# Patient Record
Sex: FEMALE | Race: WHITE | NOT HISPANIC OR LATINO | Employment: OTHER | URBAN - METROPOLITAN AREA
[De-identification: names, ages, dates, MRNs, and addresses within clinical notes are randomized per-mention and may not be internally consistent; named-entity substitution may affect disease eponyms.]

---

## 2018-12-31 ENCOUNTER — APPOINTMENT (EMERGENCY)
Dept: CT IMAGING | Facility: HOSPITAL | Age: 38
End: 2018-12-31
Payer: COMMERCIAL

## 2018-12-31 ENCOUNTER — HOSPITAL ENCOUNTER (EMERGENCY)
Facility: HOSPITAL | Age: 38
Discharge: HOME | End: 2018-12-31
Attending: EMERGENCY MEDICINE | Admitting: EMERGENCY MEDICINE
Payer: COMMERCIAL

## 2018-12-31 VITALS
WEIGHT: 160 LBS | OXYGEN SATURATION: 100 % | BODY MASS INDEX: 25.71 KG/M2 | HEIGHT: 66 IN | SYSTOLIC BLOOD PRESSURE: 137 MMHG | RESPIRATION RATE: 18 BRPM | TEMPERATURE: 98.4 F | HEART RATE: 124 BPM | DIASTOLIC BLOOD PRESSURE: 73 MMHG

## 2018-12-31 DIAGNOSIS — R30.0 DYSURIA: Primary | ICD-10-CM

## 2018-12-31 LAB
ALBUMIN SERPL BCP-MCNC: 4.5 G/DL (ref 3.5–5.7)
ALP SERPL-CCNC: 53 U/L (ref 40–150)
ALT SERPL W P-5'-P-CCNC: 19 U/L (ref 7–52)
ANION GAP SERPL CALCULATED.3IONS-SCNC: 9 MMOL/L (ref 4–13)
AST SERPL W P-5'-P-CCNC: 17 U/L (ref 13–39)
BACTERIA UR QL AUTO: ABNORMAL /HPF
BASOPHILS # BLD AUTO: 0.1 THOUSANDS/ΜL (ref 0–0.1)
BASOPHILS NFR BLD AUTO: 1 % (ref 0–2)
BILIRUB SERPL-MCNC: 0.6 MG/DL (ref 0.2–1)
BILIRUB UR QL STRIP: NEGATIVE
BUN SERPL-MCNC: 8 MG/DL (ref 7–25)
CALCIUM SERPL-MCNC: 9.4 MG/DL (ref 8.6–10.5)
CHLORIDE SERPL-SCNC: 107 MMOL/L (ref 98–107)
CLARITY UR: CLEAR
CO2 SERPL-SCNC: 22 MMOL/L (ref 21–31)
COLOR UR: YELLOW
CREAT SERPL-MCNC: 0.76 MG/DL (ref 0.6–1.2)
EOSINOPHIL # BLD AUTO: 0.1 THOUSAND/ΜL (ref 0–0.61)
EOSINOPHIL NFR BLD AUTO: 1 % (ref 0–5)
ERYTHROCYTE [DISTWIDTH] IN BLOOD BY AUTOMATED COUNT: 13.3 % (ref 11.5–14.5)
EXT PREG TEST URINE: NEGATIVE
GFR SERPL CREATININE-BSD FRML MDRD: 98 ML/MIN/1.73SQ M
GLUCOSE SERPL-MCNC: 92 MG/DL (ref 65–99)
GLUCOSE UR STRIP-MCNC: NEGATIVE MG/DL
HCT VFR BLD AUTO: 42.7 % (ref 34.8–46.1)
HGB BLD-MCNC: 14.3 G/DL (ref 12–16)
HGB UR QL STRIP.AUTO: ABNORMAL
KETONES UR STRIP-MCNC: NEGATIVE MG/DL
LEUKOCYTE ESTERASE UR QL STRIP: NEGATIVE
LYMPHOCYTES # BLD AUTO: 2.1 THOUSANDS/ΜL (ref 0.6–4.47)
LYMPHOCYTES NFR BLD AUTO: 34 % (ref 21–51)
MCH RBC QN AUTO: 30.7 PG (ref 26–34)
MCHC RBC AUTO-ENTMCNC: 33.6 G/DL (ref 31–37)
MCV RBC AUTO: 91 FL (ref 81–99)
MONOCYTES # BLD AUTO: 0.3 THOUSAND/ΜL (ref 0.17–1.22)
MONOCYTES NFR BLD AUTO: 4 % (ref 2–12)
NEUTROPHILS # BLD AUTO: 3.8 THOUSANDS/ΜL (ref 1.4–6.5)
NEUTS SEG NFR BLD AUTO: 60 % (ref 42–75)
NITRITE UR QL STRIP: NEGATIVE
NON-SQ EPI CELLS URNS QL MICRO: ABNORMAL /HPF
NRBC BLD AUTO-RTO: 0 /100 WBCS
PH UR STRIP.AUTO: 6 [PH] (ref 5–8)
PLATELET # BLD AUTO: 332 THOUSANDS/UL (ref 149–390)
PMV BLD AUTO: 7.6 FL (ref 8.6–11.7)
POTASSIUM SERPL-SCNC: 4 MMOL/L (ref 3.5–5.5)
PROT SERPL-MCNC: 6.8 G/DL (ref 6.4–8.9)
PROT UR STRIP-MCNC: NEGATIVE MG/DL
RBC # BLD AUTO: 4.67 MILLION/UL (ref 3.9–5.2)
RBC #/AREA URNS AUTO: ABNORMAL /HPF
SODIUM SERPL-SCNC: 138 MMOL/L (ref 134–143)
SP GR UR STRIP.AUTO: <=1.005 (ref 1–1.03)
UROBILINOGEN UR QL STRIP.AUTO: 0.2 E.U./DL
WBC # BLD AUTO: 6.4 THOUSAND/UL (ref 4.8–10.8)
WBC #/AREA URNS AUTO: ABNORMAL /HPF

## 2018-12-31 PROCEDURE — 80053 COMPREHEN METABOLIC PANEL: CPT | Performed by: EMERGENCY MEDICINE

## 2018-12-31 PROCEDURE — 99284 EMERGENCY DEPT VISIT MOD MDM: CPT

## 2018-12-31 PROCEDURE — 74176 CT ABD & PELVIS W/O CONTRAST: CPT

## 2018-12-31 PROCEDURE — 96361 HYDRATE IV INFUSION ADD-ON: CPT

## 2018-12-31 PROCEDURE — 81001 URINALYSIS AUTO W/SCOPE: CPT | Performed by: EMERGENCY MEDICINE

## 2018-12-31 PROCEDURE — 36415 COLL VENOUS BLD VENIPUNCTURE: CPT | Performed by: EMERGENCY MEDICINE

## 2018-12-31 PROCEDURE — 96375 TX/PRO/DX INJ NEW DRUG ADDON: CPT

## 2018-12-31 PROCEDURE — 81025 URINE PREGNANCY TEST: CPT | Performed by: EMERGENCY MEDICINE

## 2018-12-31 PROCEDURE — 96374 THER/PROPH/DIAG INJ IV PUSH: CPT

## 2018-12-31 PROCEDURE — 85025 COMPLETE CBC W/AUTO DIFF WBC: CPT | Performed by: EMERGENCY MEDICINE

## 2018-12-31 RX ORDER — SULFAMETHOXAZOLE AND TRIMETHOPRIM 800; 160 MG/1; MG/1
1 TABLET ORAL EVERY 12 HOURS SCHEDULED
COMMUNITY
End: 2018-12-31

## 2018-12-31 RX ORDER — CEPHALEXIN 500 MG/1
500 CAPSULE ORAL EVERY 12 HOURS SCHEDULED
Qty: 14 CAPSULE | Refills: 0 | Status: SHIPPED | OUTPATIENT
Start: 2018-12-31 | End: 2019-01-07

## 2018-12-31 RX ORDER — ONDANSETRON 2 MG/ML
4 INJECTION INTRAMUSCULAR; INTRAVENOUS ONCE
Status: COMPLETED | OUTPATIENT
Start: 2018-12-31 | End: 2018-12-31

## 2018-12-31 RX ORDER — KETOROLAC TROMETHAMINE 30 MG/ML
15 INJECTION, SOLUTION INTRAMUSCULAR; INTRAVENOUS ONCE
Status: COMPLETED | OUTPATIENT
Start: 2018-12-31 | End: 2018-12-31

## 2018-12-31 RX ADMIN — KETOROLAC TROMETHAMINE 15 MG: 30 INJECTION, SOLUTION INTRAMUSCULAR; INTRAVENOUS at 14:33

## 2018-12-31 RX ADMIN — SODIUM CHLORIDE 1000 ML: 0.9 INJECTION, SOLUTION INTRAVENOUS at 14:33

## 2018-12-31 RX ADMIN — ONDANSETRON 4 MG: 2 INJECTION INTRAMUSCULAR; INTRAVENOUS at 14:33

## 2018-12-31 NOTE — DISCHARGE INSTRUCTIONS
Dysuria, Ambulatory Care   GENERAL INFORMATION:   Dysuria  is trouble urinating, or pain, burning, or discomfort when you urinate  Dysuria is usually a symptom of another problem, such as a blockage or urinary tract infection  Common symptoms include the following:   · Fever     · Cloudy, bad smelling urine     · Urge to urinate often but urinating little     · Back, side, or abdominal pain     · Blood in your urine     · Discharge that smells bad     · Itching  Seek immediate care for the following symptoms:   · Severe back, side, or abdominal pain    · A fever and shaking chills    · Vomiting several times in a row  Treatment for dysuria  may include medicines to treat a bacterial infection or help decrease bladder spasms  Manage your dysuria:   · Drink liquids as directed  Ask how much liquid to drink each day and which liquids are best for you  You may need to drink more liquid than usual to flush bacteria out of your body  · A sitz bath  may help decrease your pain  Healthcare providers may give you a portable sitz bath  This is a small tub that fits in the toilet  Fill the sitz bath or a bathtub with 4 to 6 inches of warm water  Sit in the warm water for 20 minutes 2 to 3 times a day  Follow up with your healthcare provider as directed:  Write down your questions so you remember to ask them during your visits  CARE AGREEMENT:   You have the right to help plan your care  Learn about your health condition and how it may be treated  Discuss treatment options with your caregivers to decide what care you want to receive  You always have the right to refuse treatment  The above information is an  only  It is not intended as medical advice for individual conditions or treatments  Talk to your doctor, nurse or pharmacist before following any medical regimen to see if it is safe and effective for you    © 2014 [employer] is for End User's use only and may not be sold, redistributed or otherwise used for commercial purposes  All illustrations and images included in CareNotes® are the copyrighted property of A D A M [employer]  or Gat[employer] Clifford

## 2018-12-31 NOTE — ED PROVIDER NOTES
History  Chief Complaint   Patient presents with    Fatigue     patient tested positive for UTI at urgent care and put on cipro on the 15th  tested again on friday and placed on bactrim   Flank Pain     49-year-old female with history of multiple UTIs presents for evaluation of dysuria and flank pain x1 week  Patient reports that she has additionally treated for UTI on 12/15/2018 with ciprofloxacin and for influenza at that time  Patient has continued to have dysuria and flank pain since that time and was seen 2 days prior at urgent care where she was prescribed Bactrim  Patient today with chills and flank pain which is currently left-sided, 5/10, nonradiating and associated with hematuria  Patient reports no aggravating or alleviating factors        History provided by:  Patient   used: No        Prior to Admission Medications   Prescriptions Last Dose Informant Patient Reported? Taking?   sulfamethoxazole-trimethoprim (BACTRIM DS) 800-160 mg per tablet   Yes Yes   Sig: Take 1 tablet by mouth every 12 (twelve) hours      Facility-Administered Medications: None       History reviewed  No pertinent past medical history  Past Surgical History:   Procedure Laterality Date    APPENDECTOMY      HYSTERECTOMY         History reviewed  No pertinent family history  I have reviewed and agree with the history as documented  Social History   Substance Use Topics    Smoking status: Heavy Tobacco Smoker     Packs/day: 1 00    Smokeless tobacco: Never Used    Alcohol use No        Review of Systems   Constitutional: Positive for chills and fatigue  Negative for fever  HENT: Negative for rhinorrhea and sore throat  Eyes: Negative for visual disturbance  Respiratory: Negative for cough and shortness of breath  Cardiovascular: Negative for chest pain and leg swelling  Gastrointestinal: Negative for abdominal pain, diarrhea, nausea and vomiting     Genitourinary: Positive for dysuria and flank pain  Musculoskeletal: Negative for back pain and myalgias  Skin: Negative for rash  Neurological: Negative for dizziness and headaches  Psychiatric/Behavioral: Negative for confusion  All other systems reviewed and are negative  Physical Exam  Physical Exam   Constitutional: She is oriented to person, place, and time  She appears well-developed and well-nourished  HENT:   Nose: Nose normal    Mouth/Throat: Oropharynx is clear and moist  No oropharyngeal exudate  Eyes: Pupils are equal, round, and reactive to light  Conjunctivae and EOM are normal  No scleral icterus  Neck: Normal range of motion  Neck supple  No JVD present  No tracheal deviation present  Cardiovascular: Normal rate, regular rhythm and normal heart sounds  No murmur heard  Pulmonary/Chest: Effort normal and breath sounds normal  No respiratory distress  She has no wheezes  She has no rales  Abdominal: Soft  Bowel sounds are normal  There is no tenderness  There is no guarding  Musculoskeletal: Normal range of motion  She exhibits no edema or tenderness  Neurological: She is alert and oriented to person, place, and time  No cranial nerve deficit or sensory deficit  She exhibits normal muscle tone  5/5 motor, nl sens   Skin: Skin is warm and dry  Psychiatric: She has a normal mood and affect  Her behavior is normal    Nursing note and vitals reviewed        Vital Signs  ED Triage Vitals [12/31/18 1338]   Temperature Pulse Respirations Blood Pressure SpO2   98 4 °F (36 9 °C) (!) 124 18 137/73 100 %      Temp Source Heart Rate Source Patient Position - Orthostatic VS BP Location FiO2 (%)   Temporal Monitor Sitting Left arm --      Pain Score       5           Vitals:    12/31/18 1338   BP: 137/73   Pulse: (!) 124   Patient Position - Orthostatic VS: Sitting       Visual Acuity      ED Medications  Medications   sodium chloride 0 9 % bolus 1,000 mL (0 mL Intravenous Stopped 12/31/18 1624)   [facility]) injection 4 mg (4 mg Intravenous Given 12/31/18 1433)   ketorolac (TORADOL) injection 15 mg (15 mg Intravenous Given 12/31/18 1433)       Diagnostic Studies  Results Reviewed     Procedure Component Value Units Date/Time    Comprehensive metabolic panel [[id number]] Collected:  12/31/18 1411    Lab Status:  Final result Specimen:  Blood from Arm, Right Updated:  12/31/18 1454     Sodium 138 mmol/L      Potassium 4 0 mmol/L      Chloride 107 mmol/L      CO2 22 mmol/L      ANION GAP 9 mmol/L      BUN 8 mg/dL      Creatinine 0 76 mg/dL      Glucose 92 mg/dL      Calcium 9 4 mg/dL      AST 17 U/L      ALT 19 U/L      Alkaline Phosphatase 53 U/L      Total Protein 6 8 g/dL      Albumin 4 5 g/dL      Total Bilirubin 0 60 mg/dL      eGFR 98 ml/min/1 73sq m     Narrative:         National Kidney Disease Education Program recommendations are as follows:  GFR calculation is accurate only with a steady state creatinine  Chronic Kidney disease less than 60 ml/min/1 73 sq  meters  Kidney failure less than 15 ml/min/1 73 sq  meters      Urine Microscopic [[id number]]  (Abnormal) Collected:  12/31/18 1403    Lab Status:  Final result Specimen:  Urine from Urine, Clean Catch Updated:  12/31/18 1440     RBC, UA 2-4 (A) /hpf      WBC, UA None Seen /hpf      Epithelial Cells Occasional /hpf      Bacteria, UA Occasional /hpf     CBC and differential [[id number]]  (Abnormal) Collected:  12/31/18 1411    Lab Status:  Final result Specimen:  Blood from Arm, Right Updated:  12/31/18 1438     WBC 6 40 Thousand/uL      RBC 4 67 Million/uL      Hemoglobin 14 3 g/dL      Hematocrit 42 7 %      MCV 91 fL      MCH 30 7 pg      MCHC 33 6 g/dL      RDW 13 3 %      MPV 7 6 (L) fL      Platelets 773 Thousands/uL      nRBC 0 /100 WBCs      Neutrophils Relative 60 %      Lymphocytes Relative 34 %      Monocytes Relative 4 %      Eosinophils Relative 1 %      Basophils Relative 1 %      Neutrophils Absolute 3 80 Thousands/µL      Lymphocytes Absolute 2 10 Thousands/µL      Monocytes Absolute 0 30 Thousand/µL      Eosinophils Absolute 0 10 Thousand/µL      Basophils Absolute 0 10 Thousands/µL     UA w Reflex to Microscopic w Reflex to Culture [[id number]]  (Abnormal) Collected:  12/31/18 1403    Lab Status:  Final result Specimen:  Urine from Urine, Clean Catch Updated:  12/31/18 1434     Color, UA Yellow     Clarity, UA Clear     Specific Gravity, UA <=1 005 (L)     pH, UA 6 0     Leukocytes, UA Negative     Nitrite, UA Negative     Protein, UA Negative mg/dl      Glucose, UA Negative mg/dl      Ketones, UA Negative mg/dl      Urobilinogen, UA 0 2 E U /dl      Bilirubin, UA Negative     Blood, UA Trace-lysed (A)    POCT pregnancy, urine [[id number]]  (Normal) Resulted:  12/31/18 1433    Lab Status:  Final result Updated:  12/31/18 1434     EXT PREG TEST UR (Ref: Negative) negative                 CT renal stone study abdomen pelvis without contrast   Final Result by Prachi Blakely (12/31 4344)   Punctate nonobstructive left renal calculus  No obstructive calculi  No   hydronephrosis  Multiple hepatic hypodensities are seen which cannot be further assessed   on this unenhanced exam but may reflect multiple hepatic cysts  Signed by Prachi Blakely MD                 Procedures  Procedures       Phone Contacts  ED Phone Contact    ED Course  ED Course as of Dec 31 1654   Mon Dec 31, 2018   1351 Patient seen examined at bedside  Labs and imaging ordered  1550 Results of labs imaging reviewed  Urinalysis is negative for acute UTI  CT abdomen pelvis with no evidence of obstruction or pyelonephritis  2406 Discussed with patient results of labs and imaging and plan for discharge home  Patient reports that she does not feel well with Bactrim plan to give dose of Keflex in ED and prescription for Keflex outpatient  Patient follow up with Urology as an outpatient  Patient to return to ED if any change or worsening condition  MDM  CritCare Time    Disposition  Final diagnoses:   Dysuria     Time reflects when diagnosis was documented in both MDM as applicable and the Disposition within this note     Time User Action Codes Description Comment    12/31/2018  4:14 PM Rakan Blocker Add [R30 0] Dysuria       ED Disposition     ED Disposition Condition Comment    Discharge  Elder Post discharge to home/self care  Condition at discharge: Stable        Follow-up Information     Follow up With Specialties Details Why Hola Jackson MD Urology  Please call for appointment [address] [place] [address]  Emergency Department Emergency Medicine  As needed, If symptoms worsen [address] [place] [id number] [phone]          Discharge Medication List as of 12/31/2018  4:15 PM      START taking these medications    Details   cephalexin (KEFLEX) 500 mg capsule Take 1 capsule (500 mg total) by mouth every 12 (twelve) hours for 7 days, Starting Mon 12/31/2018, Until Mon 1/7/2019, Print         STOP taking these medications       sulfamethoxazole-trimethoprim (BACTRIM DS) 800-160 mg per tablet Comments:   Reason for Stopping:             No discharge procedures on file      ED Provider  Electronically Signed by           Marylu Stearns DO  12/31/18 6231

## 2020-07-23 ENCOUNTER — OFFICE VISIT (OUTPATIENT)
Dept: INTERNAL MEDICINE CLINIC | Facility: CLINIC | Age: 40
End: 2020-07-23
Payer: COMMERCIAL

## 2020-07-23 VITALS
HEIGHT: 67 IN | SYSTOLIC BLOOD PRESSURE: 116 MMHG | BODY MASS INDEX: 24.96 KG/M2 | OXYGEN SATURATION: 98 % | HEART RATE: 99 BPM | WEIGHT: 159 LBS | TEMPERATURE: 98.1 F | DIASTOLIC BLOOD PRESSURE: 76 MMHG

## 2020-07-23 DIAGNOSIS — Z87.440 HISTORY OF RECURRENT UTIS: ICD-10-CM

## 2020-07-23 DIAGNOSIS — Z87.19 HISTORY OF BARRETT'S ESOPHAGUS: ICD-10-CM

## 2020-07-23 DIAGNOSIS — M54.50 ACUTE BILATERAL LOW BACK PAIN WITHOUT SCIATICA: ICD-10-CM

## 2020-07-23 DIAGNOSIS — Z13.21 ENCOUNTER FOR VITAMIN DEFICIENCY SCREENING: ICD-10-CM

## 2020-07-23 DIAGNOSIS — Z13.6 SCREENING FOR CARDIOVASCULAR CONDITION: ICD-10-CM
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
## 2020-07-23 DIAGNOSIS — Z13.29 SCREENING FOR THYROID DISORDER: ICD-10-CM

## 2020-07-23 DIAGNOSIS — N20.0 KIDNEY STONES: ICD-10-CM

## 2020-07-23 DIAGNOSIS — D22.9 CHANGE IN MOLE: ICD-10-CM

## 2020-07-23 DIAGNOSIS — F41.9 ANXIETY: Primary | ICD-10-CM

## 2020-07-23 PROCEDURE — 99204 OFFICE O/P NEW MOD 45 MIN: CPT | Performed by: NURSE PRACTITIONER

## 2020-07-23 PROCEDURE — 3008F BODY MASS INDEX DOCD: CPT | Performed by: NURSE PRACTITIONER

## 2020-07-23 RX ORDER — PREDNISONE 10 MG/1
TABLET ORAL
Qty: 45 TABLET | Refills: 0 | Status: SHIPPED | OUTPATIENT
Start: 2020-07-23 | End: 2020-08-07 | Stop reason: ALTCHOICE

## 2020-07-23 RX ORDER — CYCLOBENZAPRINE HCL 5 MG
5 TABLET ORAL
Qty: 30 TABLET | Refills: 0 | Status: SHIPPED | OUTPATIENT
Start: 2020-07-23 | End: 2020-08-07 | Stop reason: ALTCHOICE

## 2020-07-23 NOTE — PROGRESS NOTES
Assessment/Plan: UA in office was negative but will send urine out for culture  Will obtain fasting labs  Will order imaging of her sacrum and coccyx and lumbar spine  Will start on Flexeril 5 mg at bedtime and prednisone tapered dose  Will refer to Derm to assess change in mole  Will refer to GI for repeat scope  She is deferring a mammogram and GYN exam  Will notify her once urine and labs are back  Will follow up as needed  No problem-specific Assessment & Plan notes found for this encounter           Problem List Items Addressed This Visit        Musculoskeletal and Integument    Change in mole    Relevant Orders    Ambulatory referral to Dermatology    Comprehensive metabolic panel       Genitourinary    Kidney stones    Relevant Orders    CBC and differential    XR sacrum and coccyx    Comprehensive metabolic panel       Other    History of recurrent UTIs    Relevant Orders    CBC and differential    Comprehensive metabolic panel    UA w Reflex to Microscopic w Reflex to Culture -Lab Collect    Urine culture    Anxiety - Primary    Relevant Orders    CBC and differential    Comprehensive metabolic panel    Screening for cardiovascular condition    Relevant Orders    Lipid panel    Screening for thyroid disorder    Relevant Orders    TSH, 3rd generation with Free T4 reflex    Acute bilateral low back pain without sciatica    Relevant Medications    predniSONE 10 mg tablet    cyclobenzaprine (FLEXERIL) 5 mg tablet    Other Relevant Orders    XR sacrum and coccyx    XR spine lumbar minimum 4 views non injury    Comprehensive metabolic panel    Fall    Relevant Medications    cyclobenzaprine (FLEXERIL) 5 mg tablet    Other Relevant Orders    XR spine lumbar minimum 4 views non injury    Comprehensive metabolic panel    History of Leo's esophagus    Relevant Orders    Ambulatory referral to Gastroenterology    Comprehensive metabolic panel    Encounter for vitamin deficiency screening    Relevant Orders Vitamin D 25 hydroxy      Other Visit Diagnoses     BMI 25 0-25 9,adult                Subjective:      Patient ID: Kat Marlow is a 41 y o  female  Rosario Gray is to establish care as a new patient  She has a past medical history of kidney stones, frequent UTIs, back injury, and anxiety  She states around one month ago she did fall and injury her lower back  She is still having pain and pressure and did have a back injury when she was in her teens  She states she is applying heat but nothing seems to help  She has seen Rheumatology in the past and was diagnosed with fibro but does not believe in that diagnosis  She states she does deal with her pain on her own  She does take Prilosec OTC and states she does have a history of Barretts  She has had scopes done in the past and will be due again  She is having some UTI symptoms today of feelings of warm urine and frequency  She states she is not having any burning or pain  She states she is allergy to levaquin, Bactrim, and Macrobid and usually only takes Cipro  She denies any chest pain, SOB, or palpitation  She does have some anxiety but does not want any medications  She does smoke around 1 PPD  She does drink socially and denies any drug use  She is deferring a mammogram and did have a partial hyster  She does have a cyst above her right eye she would like removed and a change in mole to her right leg  She does want to see Derm in [place]  She offers no other issues  The following portions of the patient's history were reviewed and updated as appropriate:   She  has no past medical history on file    She   Patient Active Problem List    Diagnosis Date Noted    Kidney stones 07/23/2020    History of recurrent UTIs 07/23/2020    Anxiety 07/23/2020    Screening for cardiovascular condition 07/23/2020    Screening for thyroid disorder 07/23/2020    Acute bilateral low back pain without sciatica 07/23/2020    Fall 07/23/2020    History of Leo's esophagus 07/23/2020    Change in mole 07/23/2020    Encounter for vitamin deficiency screening 07/23/2020     She  has a past surgical history that includes Appendectomy and Hysterectomy  Her family history is not on file  She  reports that she has been smoking  She has been smoking about 1 00 pack per day  She has never used smokeless tobacco  She reports that she does not drink alcohol or use drugs  Current Outpatient Medications   Medication Sig Dispense Refill    cyclobenzaprine (FLEXERIL) 5 mg tablet Take 1 tablet (5 mg total) by mouth daily at bedtime 30 tablet 0    predniSONE 10 mg tablet 50 mg by mouth daily for 3 days, then 40 mg by mouth daily for 3 days, then 30 mg by mouth daily for 3 days, then take 20 mg by mouth daily for 3 days, then 10 mg by mouth daily for 3 days 45 tablet 0     No current facility-administered medications for this visit  No current outpatient medications on file prior to visit  No current facility-administered medications on file prior to visit  She is allergic to levofloxacin and nitrofurantoin       Review of Systems   Constitutional: Negative  HENT: Negative  Eyes: Negative  Respiratory: Negative  Cardiovascular: Negative  Gastrointestinal: Negative  Endocrine: Negative  Genitourinary: Positive for frequency  Musculoskeletal: Positive for back pain  Skin: Negative  Allergic/Immunologic: Negative  Neurological: Negative  Hematological: Negative  Psychiatric/Behavioral: Negative  Objective:      /76   Pulse 99   Temp 98 1 °F (36 7 °C)   Ht 5' 6 5" (1 689 m)   Wt 72 1 kg (159 lb)   SpO2 98%   BMI 25 28 kg/m²          Physical Exam   Constitutional: She is oriented to person, place, and time  She appears well-developed and well-nourished  HENT:   Head: Normocephalic and atraumatic     Right Ear: External ear normal    Left Ear: External ear normal    Nose: Nose normal    Mouth/Throat: Oropharynx is clear and moist    Eyes: Pupils are equal, round, and reactive to light  Conjunctivae and EOM are normal    Neck: Normal range of motion  Neck supple  Cardiovascular: Normal rate, regular rhythm, normal heart sounds and intact distal pulses  Pulmonary/Chest: Effort normal and breath sounds normal    Abdominal: Soft  Bowel sounds are normal    Musculoskeletal: Normal range of motion  She exhibits tenderness  To lower back    Neurological: She is alert and oriented to person, place, and time  Skin: Skin is warm and dry  Capillary refill takes less than 2 seconds  Psychiatric: She has a normal mood and affect  Her behavior is normal  Judgment and thought content normal    Vitals reviewed  BMI Counseling: Body mass index is 25 28 kg/m²  The BMI is above normal  Nutrition recommendations include reducing portion sizes, decreasing overall calorie intake, 3-5 servings of fruits/vegetables daily, reducing fast food intake, consuming healthier snacks, decreasing soda and/or juice intake, moderation in carbohydrate intake, increasing intake of lean protein, reducing intake of saturated fat and trans fat and reducing intake of cholesterol

## 2020-07-24 PROCEDURE — 87086 URINE CULTURE/COLONY COUNT: CPT | Performed by: NURSE PRACTITIONER

## 2020-07-25 LAB — BACTERIA UR CULT: NORMAL

## 2020-07-28 ENCOUNTER — APPOINTMENT (OUTPATIENT)
Dept: LAB | Facility: CLINIC | Age: 40
End: 2020-07-28
Payer: COMMERCIAL

## 2020-07-28 DIAGNOSIS — M54.50 ACUTE BILATERAL LOW BACK PAIN WITHOUT SCIATICA: ICD-10-CM

## 2020-07-28 DIAGNOSIS — Z87.440 HISTORY OF RECURRENT UTIS: ICD-10-CM

## 2020-07-28 DIAGNOSIS — M79.7 FIBROMYALGIA: ICD-10-CM

## 2020-07-28 DIAGNOSIS — Z87.19 HISTORY OF BARRETT'S ESOPHAGUS: ICD-10-CM

## 2020-07-28 DIAGNOSIS — Z13.29 SCREENING FOR THYROID DISORDER: ICD-10-CM

## 2020-07-28 DIAGNOSIS — M54.50 ACUTE BILATERAL LOW BACK PAIN WITHOUT SCIATICA: Primary | ICD-10-CM

## 2020-07-28 DIAGNOSIS — F41.9 ANXIETY: ICD-10-CM

## 2020-07-28 DIAGNOSIS — M79.7 FIBROMYALGIA: Primary | ICD-10-CM
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
## 2020-07-28 DIAGNOSIS — D22.9 CHANGE IN MOLE: ICD-10-CM

## 2020-07-28 DIAGNOSIS — Z13.21 ENCOUNTER FOR VITAMIN DEFICIENCY SCREENING: ICD-10-CM

## 2020-07-28 DIAGNOSIS — M43.16 SPONDYLOLISTHESIS OF LUMBAR REGION: Primary | ICD-10-CM

## 2020-07-28 DIAGNOSIS — N20.0 KIDNEY STONES: ICD-10-CM

## 2020-07-28 DIAGNOSIS — R10.2 PELVIC PAIN: Primary | ICD-10-CM

## 2020-07-28 DIAGNOSIS — Z13.6 SCREENING FOR CARDIOVASCULAR CONDITION: ICD-10-CM

## 2020-07-28 LAB
25(OH)D3 SERPL-MCNC: 31.4 NG/ML (ref 30–100)
ALBUMIN SERPL BCP-MCNC: 3.6 G/DL (ref 3.5–5)
ALP SERPL-CCNC: 63 U/L (ref 46–116)
ALT SERPL W P-5'-P-CCNC: 20 U/L (ref 12–78)
ANION GAP SERPL CALCULATED.3IONS-SCNC: 6 MMOL/L (ref 4–13)
AST SERPL W P-5'-P-CCNC: 7 U/L (ref 5–45)
BASOPHILS # BLD AUTO: 0.03 THOUSANDS/ΜL (ref 0–0.1)
BASOPHILS NFR BLD AUTO: 0 % (ref 0–1)
BILIRUB SERPL-MCNC: 0.39 MG/DL (ref 0.2–1)
BUN SERPL-MCNC: 23 MG/DL (ref 5–25)
CALCIUM SERPL-MCNC: 8.8 MG/DL (ref 8.3–10.1)
CHLORIDE SERPL-SCNC: 107 MMOL/L (ref 100–108)
CHOLEST SERPL-MCNC: 247 MG/DL (ref 50–200)
CO2 SERPL-SCNC: 26 MMOL/L (ref 21–32)
CREAT SERPL-MCNC: 0.68 MG/DL (ref 0.6–1.3)
CRP SERPL QL: <3 MG/L
EOSINOPHIL # BLD AUTO: 0.07 THOUSAND/ΜL (ref 0–0.61)
EOSINOPHIL NFR BLD AUTO: 1 % (ref 0–6)
ERYTHROCYTE [DISTWIDTH] IN BLOOD BY AUTOMATED COUNT: 14.3 % (ref 11.6–15.1)
ERYTHROCYTE [SEDIMENTATION RATE] IN BLOOD: 3 MM/HOUR (ref 0–19)
GFR SERPL CREATININE-BSD FRML MDRD: 108 ML/MIN/1.73SQ M
GLUCOSE P FAST SERPL-MCNC: 94 MG/DL (ref 65–99)
HCT VFR BLD AUTO: 41.2 % (ref 34.8–46.1)
HDLC SERPL-MCNC: 71 MG/DL
HGB BLD-MCNC: 13.6 G/DL (ref 11.5–15.4)
IMM GRANULOCYTES # BLD AUTO: 0.05 THOUSAND/UL (ref 0–0.2)
IMM GRANULOCYTES NFR BLD AUTO: 0 % (ref 0–2)
LDLC SERPL CALC-MCNC: 163 MG/DL (ref 0–100)
LYMPHOCYTES # BLD AUTO: 4.01 THOUSANDS/ΜL (ref 0.6–4.47)
LYMPHOCYTES NFR BLD AUTO: 34 % (ref 14–44)
MCH RBC QN AUTO: 30.7 PG (ref 26.8–34.3)
MCHC RBC AUTO-ENTMCNC: 33 G/DL (ref 31.4–37.4)
MCV RBC AUTO: 93 FL (ref 82–98)
MONOCYTES # BLD AUTO: 0.56 THOUSAND/ΜL (ref 0.17–1.22)
MONOCYTES NFR BLD AUTO: 5 % (ref 4–12)
NEUTROPHILS # BLD AUTO: 7.25 THOUSANDS/ΜL (ref 1.85–7.62)
NEUTS SEG NFR BLD AUTO: 60 % (ref 43–75)
NONHDLC SERPL-MCNC: 176 MG/DL
NRBC BLD AUTO-RTO: 0 /100 WBCS
PLATELET # BLD AUTO: 335 THOUSANDS/UL (ref 149–390)
PMV BLD AUTO: 9.5 FL (ref 8.9–12.7)
POTASSIUM SERPL-SCNC: 3.6 MMOL/L (ref 3.5–5.3)
PROT SERPL-MCNC: 6.7 G/DL (ref 6.4–8.2)
RBC # BLD AUTO: 4.43 MILLION/UL (ref 3.81–5.12)
SODIUM SERPL-SCNC: 139 MMOL/L (ref 136–145)
TRIGL SERPL-MCNC: 64 MG/DL
TSH SERPL DL<=0.05 MIU/L-ACNC: 1.4 UIU/ML (ref 0.36–3.74)
WBC # BLD AUTO: 11.97 THOUSAND/UL (ref 4.31–10.16)

## 2020-07-28 PROCEDURE — 85652 RBC SED RATE AUTOMATED: CPT

## 2020-07-28 PROCEDURE — 85025 COMPLETE CBC W/AUTO DIFF WBC: CPT

## 2020-07-28 PROCEDURE — 84443 ASSAY THYROID STIM HORMONE: CPT

## 2020-07-28 PROCEDURE — 82306 VITAMIN D 25 HYDROXY: CPT

## 2020-07-28 PROCEDURE — 80053 COMPREHEN METABOLIC PANEL: CPT

## 2020-07-28 PROCEDURE — 36415 COLL VENOUS BLD VENIPUNCTURE: CPT

## 2020-07-28 PROCEDURE — 86140 C-REACTIVE PROTEIN: CPT

## 2020-07-28 PROCEDURE — 80061 LIPID PANEL: CPT

## 2020-08-03 DIAGNOSIS — R10.9 ABDOMINAL PAIN, UNSPECIFIED ABDOMINAL LOCATION: Primary | ICD-10-CM

## 2020-08-06 ENCOUNTER — HOSPITAL ENCOUNTER (OUTPATIENT)
Dept: ULTRASOUND IMAGING | Facility: HOSPITAL | Age: 40
Discharge: HOME | End: 2020-08-06
Payer: COMMERCIAL

## 2020-08-06 DIAGNOSIS — R10.2 PELVIC PAIN: ICD-10-CM

## 2020-08-06 DIAGNOSIS — R10.9 ABDOMINAL PAIN, UNSPECIFIED ABDOMINAL LOCATION: ICD-10-CM

## 2020-08-06 PROCEDURE — 76856 US EXAM PELVIC COMPLETE: CPT

## 2020-08-06 PROCEDURE — 76700 US EXAM ABDOM COMPLETE: CPT

## 2020-08-06 PROCEDURE — 76830 TRANSVAGINAL US NON-OB: CPT

## 2020-08-07 ENCOUNTER — HOSPITAL ENCOUNTER (OUTPATIENT)
Dept: CT IMAGING | Facility: HOSPITAL | Age: 40
Discharge: HOME | End: 2020-08-07
Payer: COMMERCIAL

## 2020-08-07 ENCOUNTER — OFFICE VISIT (OUTPATIENT)
Dept: INTERNAL MEDICINE CLINIC | Facility: CLINIC | Age: 40
End: 2020-08-07
Payer: COMMERCIAL

## 2020-08-07 VITALS
HEART RATE: 99 BPM | SYSTOLIC BLOOD PRESSURE: 118 MMHG | DIASTOLIC BLOOD PRESSURE: 86 MMHG | OXYGEN SATURATION: 99 % | TEMPERATURE: 97.5 F

## 2020-08-07 DIAGNOSIS — R10.31 RIGHT LOWER QUADRANT ABDOMINAL PAIN: ICD-10-CM

## 2020-08-07 DIAGNOSIS — R10.33 PERIUMBILICAL ABDOMINAL PAIN: ICD-10-CM

## 2020-08-07 DIAGNOSIS — R10.33 PERIUMBILICAL ABDOMINAL PAIN: Primary | ICD-10-CM

## 2020-08-07 DIAGNOSIS — R11.0 NAUSEA: ICD-10-CM

## 2020-08-07 PROCEDURE — G1004 CDSM NDSC: HCPCS

## 2020-08-07 PROCEDURE — 99213 OFFICE O/P EST LOW 20 MIN: CPT | Performed by: NURSE PRACTITIONER

## 2020-08-07 PROCEDURE — 74176 CT ABD & PELVIS W/O CONTRAST: CPT

## 2020-08-07 RX ORDER — NAPROXEN 500 MG/1
500 TABLET ORAL 2 TIMES DAILY WITH MEALS
Qty: 30 TABLET | Refills: 0 | Status: ON HOLD | OUTPATIENT
Start: 2020-08-07 | End: 2020-10-13

## 2020-08-07 NOTE — PATIENT INSTRUCTIONS
Acute Abdominal Pain   WHAT YOU NEED TO KNOW:   What do I need to know about acute abdominal pain? Acute abdominal pain usually starts suddenly and gets worse quickly  What are minor causes of acute abdominal pain? · An allergic reaction to food, or food poisoning    · Stress    · Acid reflux    · Constipation    · Monthly period pain in females  What are serious causes of acute abdominal pain? · Inflammation or rupture of your appendix    · Swelling or an infection in your abdomen or organ    · A blockage in your bowels    · An ulcer or a tear in your esophagus, stomach, or intestines    · Bleeding in your abdomen or an organ    · Stones in your kidney or gallbladder    · Diseases of the fallopian tubes or ovaries    · An ectopic pregnancy  How is the cause of acute abdominal pain diagnosed? Your healthcare provider will ask about your signs and symptoms  Tell the provider when your symptoms started and about any recent travel or surgery  Also tell him what makes the pain better or worse, and what treatments you have tried  The provider will examine you  Based on what your provider finds from the exam, and your symptoms, you may need other tests  Examples include blood or urine tests, an ultrasound, a CT scan, or an endoscopy  How is acute abdominal pain treated? Treatment may depend on the cause of your abdominal pain  You may need any of the following:  · Medicines  may be given to decrease pain, treat an infection, and manage your symptoms, such as constipation  · Surgery  may be needed to treat a serious cause of abdominal pain  Examples include surgery to treat appendicitis or a blockage in your bowels  How can I manage my symptoms? · Apply heat  on your abdomen for 20 to 30 minutes every 2 hours for as many days as directed  Heat helps decrease pain and muscle spasms  · Make changes to the food you eat as directed  Do not eat foods that cause abdominal pain or other symptoms   Eat small meals more often  ¨ Eat more high-fiber foods if you are constipated  High-fiber foods include fruits, vegetables, whole-grain foods, and legumes  ¨ Do not eat foods that cause gas if you have bloating  Examples include broccoli, cabbage, and cauliflower  Do not drink soda or carbonated drinks, because these may also cause gas  ¨ Do not eat foods or drinks that contain sorbitol or fructose if you have diarrhea and bloating  Some examples are fruit juices, candy, jelly, and sugar-free gum  ¨ Do not eat high-fat foods, such as fried foods, cheeseburgers, hot dogs, and desserts  ¨ Limit or do not drink caffeine  Caffeine may make symptoms, such as heart burn or nausea, worse  ¨ Drink plenty of liquids to prevent dehydration from diarrhea or vomiting  Ask your healthcare provider how much liquid to drink each day and which liquids are best for you  · Manage your stress  Stress may cause abdominal pain  Your healthcare provider may recommend relaxation techniques and deep breathing exercises to help decrease your stress  Your healthcare provider may recommend you talk to someone about your stress or anxiety, such as a counselor or a trusted friend  Get plenty of sleep and exercise regularly  · Limit or do not drink alcohol  Alcohol can make your abdominal pain worse  Ask your healthcare provider if it is safe for you to drink alcohol  Also ask how much is safe for you to drink  · Do not smoke  Nicotine and other chemicals in cigarettes can damage your esophagus and stomach  Ask your healthcare provider for information if you currently smoke and need help to quit  E-cigarettes or smokeless tobacco still contain nicotine  Talk to your healthcare provider before you use these products  When should I seek immediate care? · You vomit blood or cannot stop vomiting  · You have blood in your bowel movement or it looks like tar  · You have bleeding from your rectum       · Your abdomen is larger than usual, more painful, and hard  · You have severe pain in your abdomen  · You stop passing gas and having bowel movements  · You feel weak, dizzy, or faint  When should I contact my healthcare provider? · You have a fever  · You have new signs and symptoms  · Your symptoms do not get better with treatment  · You have questions or concerns about your condition or care  CARE AGREEMENT:   You have the right to help plan your care  Learn about your health condition and how it may be treated  Discuss treatment options with your caregivers to decide what care you want to receive  You always have the right to refuse treatment  The above information is an  only  It is not intended as medical advice for individual conditions or treatments  Talk to your doctor, nurse or pharmacist before following any medical regimen to see if it is safe and effective for you  © 2017 [address] [employer] Information is for End User's use only and may not be sold, redistributed or otherwise used for commercial purposes  All illustrations and images included in CareNotes® are the copyrighted property of A D A M , Inc  or [employer] Clifford

## 2020-08-07 NOTE — PROGRESS NOTES
Assessment/Plan: Did obtain US imaging which did show Right ovary: 11 mL (normal is 2 mL to 20 mL) 29 mm simple ovarian cyst   8 mm simple paraovarian cyst   No suspicious masses or abnormalities requiring dedicated follow-up  Doppler flow within normal limits  Normal arterial and venous spectral waveforms  Has previous history of hysterectomy and oophrectomy  Will send for STAT ct scan now  Will inform patient once imaging is back  No problem-specific Assessment & Plan notes found for this encounter  Problem List Items Addressed This Visit        Other    Periumbilical abdominal pain - Primary    Relevant Orders    CT abdomen pelvis wo contrast    Nausea    Relevant Orders    CT abdomen pelvis wo contrast      Other Visit Diagnoses     Right lower quadrant abdominal pain        Relevant Orders    CT abdomen pelvis wo contrast            Subjective:      Patient ID: Mazin Barr is a 41 y o  female  Gordo Be is for a acute visit  She is having severe abdominal pain for the past two weeks  She was lifting heavy furniture and did feel a pop in her abdomen which is now radiating down to her RLQ and groin  She can not sit and does get some relief with laying  She is having a hard time bending and if lifting anything at all feels pulling and burning in her abdomen  She is having some nausea  She is having diarrhea  She did have an transvaginal and abdominal US which did show Right ovary: 11 mL (normal is 2 mL to 20 mL) 29 mm simple ovarian cyst   8 mm simple paraovarian cyst   No suspicious masses or abnormalities requiring dedicated follow-up  She offers no other issues  The following portions of the patient's history were reviewed and updated as appropriate:   She  has no past medical history on file    She   Patient Active Problem List    Diagnosis Date Noted    Periumbilical abdominal pain 08/07/2020    Nausea 08/07/2020    Kidney stones 07/23/2020    History of recurrent UTIs 07/23/2020    Anxiety 07/23/2020    Screening for cardiovascular condition 07/23/2020    Screening for thyroid disorder 07/23/2020    Acute bilateral low back pain without sciatica 07/23/2020    Fall 07/23/2020    History of Leo's esophagus 07/23/2020    Change in mole 07/23/2020    Encounter for vitamin deficiency screening 07/23/2020     She  has a past surgical history that includes Appendectomy and Hysterectomy  Her family history is not on file  She  reports that she has been smoking  She has been smoking about 1 00 pack per day  She has never used smokeless tobacco  She reports that she does not drink alcohol or use drugs  Current Outpatient Medications   Medication Sig Dispense Refill    cyclobenzaprine (FLEXERIL) 5 mg tablet Take 1 tablet (5 mg total) by mouth daily at bedtime 30 tablet 0    predniSONE 10 mg tablet 50 mg by mouth daily for 3 days, then 40 mg by mouth daily for 3 days, then 30 mg by mouth daily for 3 days, then take 20 mg by mouth daily for 3 days, then 10 mg by mouth daily for 3 days 45 tablet 0     No current facility-administered medications for this visit  Current Outpatient Medications on File Prior to Visit   Medication Sig    cyclobenzaprine (FLEXERIL) 5 mg tablet Take 1 tablet (5 mg total) by mouth daily at bedtime    predniSONE 10 mg tablet 50 mg by mouth daily for 3 days, then 40 mg by mouth daily for 3 days, then 30 mg by mouth daily for 3 days, then take 20 mg by mouth daily for 3 days, then 10 mg by mouth daily for 3 days     No current facility-administered medications on file prior to visit  She is allergic to levofloxacin and nitrofurantoin       Review of Systems   Constitutional: Negative  HENT: Negative  Eyes: Negative  Respiratory: Negative  Cardiovascular: Negative  Gastrointestinal: Positive for abdominal pain and nausea  Endocrine: Negative  Genitourinary: Negative  Musculoskeletal: Positive for arthralgias and myalgias     Skin: Negative  Allergic/Immunologic: Negative  Neurological: Negative  Hematological: Negative  Psychiatric/Behavioral: Negative  Objective: There were no vitals taken for this visit  Physical Exam  Vitals signs reviewed  Constitutional:       Appearance: Normal appearance  She is normal weight  HENT:      Head: Normocephalic and atraumatic  Right Ear: Tympanic membrane, ear canal and external ear normal       Left Ear: Tympanic membrane, ear canal and external ear normal       Nose: Nose normal       Mouth/Throat:      Mouth: Mucous membranes are moist       Pharynx: Oropharynx is clear  Eyes:      Extraocular Movements: Extraocular movements intact  Conjunctiva/sclera: Conjunctivae normal       Pupils: Pupils are equal, round, and reactive to light  Neck:      Musculoskeletal: Normal range of motion and neck supple  Cardiovascular:      Rate and Rhythm: Normal rate and regular rhythm  Pulses: Normal pulses  Heart sounds: Normal heart sounds  Pulmonary:      Effort: Pulmonary effort is normal       Breath sounds: Normal breath sounds  Abdominal:      General: Abdomen is flat  Bowel sounds are normal       Palpations: Abdomen is soft  Tenderness: There is abdominal tenderness  There is guarding  Comments: Patient was crying on exam when palpating the periumbilical region and RLQ in groin    Musculoskeletal: Normal range of motion  Skin:     General: Skin is warm and dry  Capillary Refill: Capillary refill takes less than 2 seconds  Neurological:      General: No focal deficit present  Mental Status: She is alert and oriented to person, place, and time  Mental status is at baseline  Psychiatric:         Mood and Affect: Mood normal          Thought Content:  Thought content normal          Judgment: Judgment normal

## 2020-08-17 ENCOUNTER — CONSULT (OUTPATIENT)
Dept: SURGERY | Facility: CLINIC | Age: 40
End: 2020-08-17
Payer: COMMERCIAL

## 2020-08-17 VITALS
WEIGHT: 160 LBS | DIASTOLIC BLOOD PRESSURE: 71 MMHG | HEIGHT: 67 IN | TEMPERATURE: 98.4 F | BODY MASS INDEX: 25.11 KG/M2 | HEART RATE: 93 BPM | SYSTOLIC BLOOD PRESSURE: 108 MMHG

## 2020-08-17 DIAGNOSIS — R10.33 PERIUMBILICAL ABDOMINAL PAIN: Primary | ICD-10-CM

## 2020-08-17 DIAGNOSIS — M54.9 BACK PAIN: ICD-10-CM

## 2020-08-17 PROCEDURE — 3008F BODY MASS INDEX DOCD: CPT | Performed by: SURGERY

## 2020-08-17 PROCEDURE — 99243 OFF/OP CNSLTJ NEW/EST LOW 30: CPT | Performed by: SURGERY

## 2020-08-17 NOTE — PROGRESS NOTES
Assessment/Plan/Follow up information       Diagnosis ICD-10-CM Associated Orders   1  Periumbilical abdominal pain  R10 33 MRI lumbar spine wo contrast   2  Back pain  M54 9 MRI lumbar spine wo contrast    Physical exam imaging consistent with an umbilical hernia, patient would like to have this surgically corrected  Unclear etiology of right thigh/groin pain; there is a possibility this may be a inguinal/femoral hernia  However given history of traumatic fall, as well as degenerative disc disease there is a possibility for some sort of neurological/spinal impingement that may be causing a referred pain to the right groin thigh area  As such I believe it is reasonable to preform a MRI of the lumbar region to assess  Pending results of MRI, will proceed with scheduling surgery for repair of umbilical hernia with the potential for correction of a femoral/inguinal hernia  All recent lab work and imagining reviewed on today's visit with patient, appropriate follow up was initiated if needed  Patient was counseled/education regarding their diagnosis, and the associated plan  They agreed with plan, all questions and concerns were answered/addressed  Advised to contact me or the office with any concerns or questions  In the event of an emergency, and unable to contact a provider they are to go to the emergency room  Subjective    HPI:  This is a 56-year-old woman presents at the referral of her PCP for generalized periumbilical pain  Patient states approximately 1 month ago she was caring a large box fell slipped landed on her lower back  States since that time she has been developing low back pain and severe intermittent periumbilical pain, as well as intermittent severe right groin and right thigh pain  Prior to this episode she states that she had CT imaging and x-rays of her back which showed severe degenerative disease and spinal stenosis   She was extensively worked up by PCP with no acute findings/pathology  However a small fat containing periumbilical hernia was noted on CT  In lieu of any other causes of her periumbilical pain patient presents here for evaluation hernia  She does endorse a significant abdominal surgical history of a partial hysterectomy as well as an appendectomy as well as a history of IBS  She endorses chronic constipation/diarrhea secondary to the IBS  However reports no acute changes in her bowel movements  Tolerating p o  Diet well no fevers and chills no abdominal distension      Review of Systems   Constitutional: Negative for activity change, appetite change, chills, fatigue and fever  HENT: Negative for congestion, dental problem, drooling, ear discharge, ear pain, facial swelling, postnasal drip, rhinorrhea and sinus pain  Eyes: Negative for photophobia, pain, discharge and itching  Respiratory: Negative for apnea, cough, chest tightness and shortness of breath  Cardiovascular: Negative for chest pain and leg swelling  Gastrointestinal: Positive for abdominal pain  Negative for abdominal distention, anal bleeding, constipation, diarrhea and nausea  Endocrine: Negative for cold intolerance, heat intolerance and polydipsia  Genitourinary: Negative for difficulty urinating  Musculoskeletal: Negative for arthralgias, gait problem, joint swelling and myalgias  Skin: Negative for color change and pallor  Allergic/Immunologic: Negative for immunocompromised state  Neurological: Negative for dizziness, seizures, facial asymmetry, weakness, light-headedness, numbness and headaches  Psychiatric/Behavioral: Negative for agitation, behavioral problems, confusion, decreased concentration and dysphoric mood  Objective    Vitals:    08/17/20 1507   BP: 108/71   Pulse: 93   Temp: 98 4 °F (36 9 °C)         Physical Exam  Constitutional:       General: She is not in acute distress  Appearance: She is well-developed     HENT:      Head: Normocephalic and atraumatic  Eyes:      Conjunctiva/sclera: Conjunctivae normal       Pupils: Pupils are equal, round, and reactive to light  Neck:      Musculoskeletal: Normal range of motion and neck supple  Cardiovascular:      Rate and Rhythm: Normal rate and regular rhythm  Heart sounds: Normal heart sounds  No murmur  No friction rub  Pulmonary:      Effort: Pulmonary effort is normal       Breath sounds: Normal breath sounds  Abdominal:      General: Bowel sounds are normal  There is no distension or abdominal bruit  There are no signs of injury  Palpations: Abdomen is soft  There is no shifting dullness, fluid wave, hepatomegaly, splenomegaly, mass or pulsatile mass  Tenderness: There is abdominal tenderness in the periumbilical area  Comments: Unable to palpate any hernias   Musculoskeletal: Normal range of motion  Skin:     General: Skin is warm  Capillary Refill: Capillary refill takes less than 2 seconds  Neurological:      Mental Status: She is alert and oriented to person, place, and time  Motor: No abnormal muscle tone  Coordination: Coordination normal    Psychiatric:         Behavior: Behavior normal          Thought Content: Thought content normal           Portions of the record may have been created with voice recognition software  Occasional wrong word or "sound a like" substitutions may have occurred due to the inherent limitations of voice recognition software  Read the chart carefully and recognize, using context, where substitutions have occurred  Contact me with any questions         Daysi Prasad MD 08/17/20

## 2020-08-24 ENCOUNTER — TELEPHONE (OUTPATIENT)
Dept: SURGERY | Facility: CLINIC | Age: 40
End: 2020-08-24

## 2020-08-24 NOTE — TELEPHONE ENCOUNTER
A follow-up phone call was made to the patient as she is approximately 1 week out from her initial office visit  She still having considerable umbilical pain in addition to pain at the right groin  She states the pain in the right groin still is is pretty significant and not improved at all since her initial visit  Furthermore she states that she is now having some worsening lower back pain and basically has been in agony for the past week and is again hoping that we can get the MRI to further evaluate

## 2020-08-25 ENCOUNTER — HOSPITAL ENCOUNTER (OUTPATIENT)
Dept: MRI IMAGING | Facility: HOSPITAL | Age: 40
Discharge: HOME | End: 2020-08-25
Payer: COMMERCIAL

## 2020-08-25 DIAGNOSIS — R10.33 PERIUMBILICAL ABDOMINAL PAIN: ICD-10-CM

## 2020-08-25 DIAGNOSIS — M54.9 BACK PAIN: ICD-10-CM

## 2020-08-25 PROCEDURE — 72148 MRI LUMBAR SPINE W/O DYE: CPT

## 2020-08-25 PROCEDURE — G1004 CDSM NDSC: HCPCS

## 2020-08-28 ENCOUNTER — TELEPHONE (OUTPATIENT)
Dept: SURGERY | Facility: CLINIC | Age: 40
End: 2020-08-28

## 2020-08-28 NOTE — TELEPHONE ENCOUNTER
PCP office called - pt had MRI order by Dr Mikala Lowery  PCP is requesting that Dr Mikala Lowery or Dr Mook Lua please review results and call pt  I am sending this Dr Mikala Lowery and Dr Mook Lua for advisement and processing      Thanks, Mo

## 2020-09-10 ENCOUNTER — OFFICE VISIT (OUTPATIENT)
Dept: SURGERY | Facility: CLINIC | Age: 40
End: 2020-09-10
Payer: COMMERCIAL

## 2020-09-10 VITALS
HEART RATE: 89 BPM | SYSTOLIC BLOOD PRESSURE: 108 MMHG | TEMPERATURE: 99.4 F | BODY MASS INDEX: 24.48 KG/M2 | DIASTOLIC BLOOD PRESSURE: 73 MMHG | HEIGHT: 67 IN | WEIGHT: 156 LBS

## 2020-09-10 DIAGNOSIS — R10.31 RIGHT GROIN PAIN: ICD-10-CM

## 2020-09-10 DIAGNOSIS — K42.9 UMBILICAL HERNIA WITHOUT OBSTRUCTION AND WITHOUT GANGRENE: Primary | ICD-10-CM

## 2020-09-10 PROCEDURE — 99213 OFFICE O/P EST LOW 20 MIN: CPT | Performed by: SURGERY

## 2020-09-19 PROBLEM — K42.9 UMBILICAL HERNIA WITHOUT OBSTRUCTION AND WITHOUT GANGRENE: Status: ACTIVE | Noted: 2020-09-19

## 2020-09-19 PROBLEM — R10.31 RIGHT GROIN PAIN: Status: ACTIVE | Noted: 2020-09-19

## 2020-09-19 RX ORDER — CHLORHEXIDINE GLUCONATE 4 G/100ML
SOLUTION TOPICAL DAILY PRN
Status: CANCELLED | OUTPATIENT
Start: 2020-09-19

## 2020-09-19 RX ORDER — SODIUM CHLORIDE, SODIUM LACTATE, POTASSIUM CHLORIDE, CALCIUM CHLORIDE 600; 310; 30; 20 MG/100ML; MG/100ML; MG/100ML; MG/100ML
125 INJECTION, SOLUTION INTRAVENOUS CONTINUOUS
Status: CANCELLED | OUTPATIENT
Start: 2020-09-19

## 2020-09-19 RX ORDER — HEPARIN SODIUM 5000 [USP'U]/ML
5000 INJECTION, SOLUTION INTRAVENOUS; SUBCUTANEOUS ONCE
Status: CANCELLED | OUTPATIENT
Start: 2020-10-13 | End: 2020-09-19

## 2020-09-19 RX ORDER — CEFAZOLIN SODIUM 1 G/50ML
1000 SOLUTION INTRAVENOUS ONCE
Status: CANCELLED | OUTPATIENT
Start: 2020-10-13 | End: 2020-09-19

## 2020-09-19 NOTE — H&P
Assessment/Plan:    Umbilical hernia without obstruction and without gangrene  Symptomatic umbilical hernia  Patient like to have this repaired  I think this is reasonable  We will schedule her for an open umbilical repair, possible mesh  The procedure self including all associated risks and benefits were discussed at great length  Patient verbalized understands risks is willing proceed, consent was signed  She is young and otherwise healthy will not require any additional preoperative workup  Right groin pain  Continues with right groin pain specially with increased activity  On exam and CT not showing obvious hernia  Patient still complaining and would like this area explored  I think this is reasonable  There could be a small hernia that is just not showing up on CT scan  She is agreeable to right groin exploration and potential repair of inguinal hernia if present  Furthermore because we are fixing the umbilical hernia she would like to have this explored during that time  I think this is again is reasonable  The procedure self including all associated risks and benefits were discussed the patient great length  The patient verbalized understands risks is willing proceed, consent was signed  Patient again understands that we may not find any evidence of hernia at that point the exploration would be aborted  Diagnoses and all orders for this visit:    Umbilical hernia without obstruction and without gangrene    Right groin pain          Subjective:      Patient ID: Su Leslie is a 41 y o  female  78-year-old female who presents today for follow-up concerning periumbilical and right groin pain  She has undergone imaging to include CT scan which does confirm umbilical hernia but does not show an obvious presence of right inguinal hernia  Again on previous exam there is no palpable hernia    However she continues to have periumbilical pain and right groin pain with increased activity  In both cases pain is rather localized to either the groin or the umbilical region  The right groin pain is very suspicious for an inguinal hernia  She denies overlying skin changes  Again no groin bulge  Patient states that because of her job she like to go ahead and have this hernia repaired and also the right groin explored  Denies any other associated symptoms  Again no nausea vomiting  No fevers or chills  No changes in bladder bowel habits  Tolerates regular diet  The following portions of the patient's history were reviewed and updated as appropriate:   She  has no past medical history on file  She   Patient Active Problem List    Diagnosis Date Noted    Umbilical hernia without obstruction and without gangrene 09/19/2020    Right groin pain 36/66/4275    Periumbilical abdominal pain 08/07/2020    Nausea 08/07/2020    Kidney stones 07/23/2020    History of recurrent UTIs 07/23/2020    Anxiety 07/23/2020    Screening for cardiovascular condition 07/23/2020    Screening for thyroid disorder 07/23/2020    Acute bilateral low back pain without sciatica 07/23/2020    Fall 07/23/2020    History of Leo's esophagus 07/23/2020    Change in mole 07/23/2020    Encounter for vitamin deficiency screening 07/23/2020     She  has a past surgical history that includes Appendectomy and Hysterectomy  Her family history is not on file  She  reports that she has been smoking  She has been smoking about 1 00 pack per day  She has never used smokeless tobacco  She reports that she does not drink alcohol or use drugs  Current Outpatient Medications   Medication Sig Dispense Refill    naproxen (NAPROSYN) 500 mg tablet Take 1 tablet (500 mg total) by mouth 2 (two) times a day with meals 30 tablet 0     No current facility-administered medications for this visit  She is allergic to bactrim [sulfamethoxazole-trimethoprim]; levofloxacin; and nitrofurantoin       Review of Systems Constitutional: Negative for activity change, appetite change, chills, diaphoresis, fatigue, fever and unexpected weight change  Gastrointestinal: Positive for abdominal pain (Umbilical and right groin)  Negative for diarrhea, nausea and vomiting  Skin: Negative for color change, pallor, rash and wound  Objective:      /73   Pulse 89   Temp 99 4 °F (37 4 °C)   Ht 5' 6 5" (1 689 m)   Wt 70 8 kg (156 lb)   BMI 24 80 kg/m²          Physical Exam  Vitals signs reviewed  Constitutional:       Appearance: Normal appearance  She is not diaphoretic  HENT:      Head: Normocephalic and atraumatic  Eyes:      General: No scleral icterus  Neck:      Musculoskeletal: Normal range of motion  Cardiovascular:      Rate and Rhythm: Normal rate and regular rhythm  Heart sounds: No murmur  No friction rub  No gallop  Pulmonary:      Effort: Pulmonary effort is normal  No respiratory distress  Breath sounds: Normal breath sounds  No stridor  No wheezing, rhonchi or rales  Abdominal:      General: Abdomen is flat  There is no distension  Palpations: Abdomen is soft  There is no mass  Tenderness: There is abdominal tenderness ( tender palpation periumbilical region addition to right groin )  There is no guarding or rebound  Hernia: A hernia (Umbilical) is present  Musculoskeletal: Normal range of motion  Skin:     General: Skin is warm and dry  Coloration: Skin is not jaundiced or pale  Findings: No bruising or erythema  Neurological:      General: No focal deficit present  Mental Status: She is alert and oriented to person, place, and time  Cranial Nerves: No cranial nerve deficit  Psychiatric:         Mood and Affect: Mood normal          Behavior: Behavior normal          Thought Content:  Thought content normal          Judgment: Judgment normal

## 2020-09-19 NOTE — ASSESSMENT & PLAN NOTE
Continues with right groin pain specially with increased activity  On exam and CT not showing obvious hernia  Patient still complaining and would like this area explored  I think this is reasonable  There could be a small hernia that is just not showing up on CT scan  She is agreeable to right groin exploration and potential repair of inguinal hernia if present  Furthermore because we are fixing the umbilical hernia she would like to have this explored during that time  I think this is again is reasonable  The procedure self including all associated risks and benefits were discussed the patient great length  The patient verbalized understands risks is willing proceed, consent was signed  Patient again understands that we may not find any evidence of hernia at that point the exploration would be aborted

## 2020-09-19 NOTE — PROGRESS NOTES
Assessment/Plan:    Umbilical hernia without obstruction and without gangrene  Symptomatic umbilical hernia  Patient like to have this repaired  I think this is reasonable  We will schedule her for an open umbilical repair, possible mesh  The procedure self including all associated risks and benefits were discussed at great length  Patient verbalized understands risks is willing proceed, consent was signed  She is young and otherwise healthy will not require any additional preoperative workup  Right groin pain  Continues with right groin pain specially with increased activity  On exam and CT not showing obvious hernia  Patient still complaining and would like this area explored  I think this is reasonable  There could be a small hernia that is just not showing up on CT scan  She is agreeable to right groin exploration and potential repair of inguinal hernia if present  Furthermore because we are fixing the umbilical hernia she would like to have this explored during that time  I think this is again is reasonable  The procedure self including all associated risks and benefits were discussed the patient great length  The patient verbalized understands risks is willing proceed, consent was signed  Patient again understands that we may not find any evidence of hernia at that point the exploration would be aborted  Diagnoses and all orders for this visit:    Umbilical hernia without obstruction and without gangrene    Right groin pain          Subjective:      Patient ID: Hunter Guajardo is a 41 y o  female  67-year-old female who presents today for follow-up concerning periumbilical and right groin pain  She has undergone imaging to include CT scan which does confirm umbilical hernia but does not show an obvious presence of right inguinal hernia  Again on previous exam there is no palpable hernia    However she continues to have periumbilical pain and right groin pain with increased activity  In both cases pain is rather localized to either the groin or the umbilical region  The right groin pain is very suspicious for an inguinal hernia  She denies overlying skin changes  Again no groin bulge  Patient states that because of her job she like to go ahead and have this hernia repaired and also the right groin explored  Denies any other associated symptoms  Again no nausea vomiting  No fevers or chills  No changes in bladder bowel habits  Tolerates regular diet  The following portions of the patient's history were reviewed and updated as appropriate:   She  has no past medical history on file  She   Patient Active Problem List    Diagnosis Date Noted    Umbilical hernia without obstruction and without gangrene 09/19/2020    Right groin pain 88/59/7225    Periumbilical abdominal pain 08/07/2020    Nausea 08/07/2020    Kidney stones 07/23/2020    History of recurrent UTIs 07/23/2020    Anxiety 07/23/2020    Screening for cardiovascular condition 07/23/2020    Screening for thyroid disorder 07/23/2020    Acute bilateral low back pain without sciatica 07/23/2020    Fall 07/23/2020    History of Leo's esophagus 07/23/2020    Change in mole 07/23/2020    Encounter for vitamin deficiency screening 07/23/2020     She  has a past surgical history that includes Appendectomy and Hysterectomy  Her family history is not on file  She  reports that she has been smoking  She has been smoking about 1 00 pack per day  She has never used smokeless tobacco  She reports that she does not drink alcohol or use drugs  Current Outpatient Medications   Medication Sig Dispense Refill    naproxen (NAPROSYN) 500 mg tablet Take 1 tablet (500 mg total) by mouth 2 (two) times a day with meals 30 tablet 0     No current facility-administered medications for this visit  She is allergic to bactrim [sulfamethoxazole-trimethoprim]; levofloxacin; and nitrofurantoin       Review of Systems Constitutional: Negative for activity change, appetite change, chills, diaphoresis, fatigue, fever and unexpected weight change  Gastrointestinal: Positive for abdominal pain (Umbilical and right groin)  Negative for diarrhea, nausea and vomiting  Skin: Negative for color change, pallor, rash and wound  Objective:      /73   Pulse 89   Temp 99 4 °F (37 4 °C)   Ht 5' 6 5" (1 689 m)   Wt 70 8 kg (156 lb)   BMI 24 80 kg/m²          Physical Exam  Vitals signs reviewed  Constitutional:       Appearance: Normal appearance  She is not diaphoretic  HENT:      Head: Normocephalic and atraumatic  Eyes:      General: No scleral icterus  Neck:      Musculoskeletal: Normal range of motion  Cardiovascular:      Rate and Rhythm: Normal rate and regular rhythm  Heart sounds: No murmur  No friction rub  No gallop  Pulmonary:      Effort: Pulmonary effort is normal  No respiratory distress  Breath sounds: Normal breath sounds  No stridor  No wheezing, rhonchi or rales  Abdominal:      General: Abdomen is flat  There is no distension  Palpations: Abdomen is soft  There is no mass  Tenderness: There is abdominal tenderness ( tender palpation periumbilical region addition to right groin )  There is no guarding or rebound  Hernia: A hernia (Umbilical) is present  Musculoskeletal: Normal range of motion  Skin:     General: Skin is warm and dry  Coloration: Skin is not jaundiced or pale  Findings: No bruising or erythema  Neurological:      General: No focal deficit present  Mental Status: She is alert and oriented to person, place, and time  Cranial Nerves: No cranial nerve deficit  Psychiatric:         Mood and Affect: Mood normal          Behavior: Behavior normal          Thought Content:  Thought content normal          Judgment: Judgment normal

## 2020-09-19 NOTE — ASSESSMENT & PLAN NOTE
Symptomatic umbilical hernia  Patient like to have this repaired  I think this is reasonable  We will schedule her for an open umbilical repair, possible mesh  The procedure self including all associated risks and benefits were discussed at great length  Patient verbalized understands risks is willing proceed, consent was signed  She is young and otherwise healthy will not require any additional preoperative workup

## 2020-10-12 ENCOUNTER — ANESTHESIA EVENT (OUTPATIENT)
Dept: PERIOP | Facility: HOSPITAL | Age: 40
End: 2020-10-12
Payer: COMMERCIAL

## 2020-10-13 ENCOUNTER — HOSPITAL ENCOUNTER (OUTPATIENT)
Facility: HOSPITAL | Age: 40
Setting detail: OUTPATIENT SURGERY
Discharge: HOME | End: 2020-10-13
Attending: SURGERY | Admitting: SURGERY
Payer: COMMERCIAL

## 2020-10-13 ENCOUNTER — ANESTHESIA (OUTPATIENT)
Dept: PERIOP | Facility: HOSPITAL | Age: 40
End: 2020-10-13
Payer: COMMERCIAL

## 2020-10-13 VITALS
DIASTOLIC BLOOD PRESSURE: 64 MMHG | RESPIRATION RATE: 18 BRPM | HEART RATE: 61 BPM | OXYGEN SATURATION: 99 % | SYSTOLIC BLOOD PRESSURE: 111 MMHG | TEMPERATURE: 97.2 F

## 2020-10-13 VITALS — HEART RATE: 70 BPM

## 2020-10-13 DIAGNOSIS — R10.31 RIGHT GROIN PAIN: ICD-10-CM

## 2020-10-13 DIAGNOSIS — K42.9 UMBILICAL HERNIA WITHOUT OBSTRUCTION AND WITHOUT GANGRENE: ICD-10-CM

## 2020-10-13 PROCEDURE — 49585 PR REPAIR UMBILICAL HERN,5+Y/O,REDUC: CPT | Performed by: SURGERY

## 2020-10-13 PROCEDURE — 49505 PRP I/HERN INIT REDUC >5 YR: CPT | Performed by: SURGERY

## 2020-10-13 PROCEDURE — 49585 PR REPAIR UMBILICAL HERN,5+Y/O,REDUC: CPT

## 2020-10-13 PROCEDURE — NC001 PR NO CHARGE: Performed by: SURGERY

## 2020-10-13 PROCEDURE — 11442 EXC FACE-MM B9+MARG 1.1-2 CM: CPT

## 2020-10-13 PROCEDURE — C1781 MESH (IMPLANTABLE): HCPCS | Performed by: SURGERY

## 2020-10-13 PROCEDURE — 49505 PRP I/HERN INIT REDUC >5 YR: CPT

## 2020-10-13 PROCEDURE — 88304 TISSUE EXAM BY PATHOLOGIST: CPT | Performed by: PATHOLOGY

## 2020-10-13 PROCEDURE — 11442 EXC FACE-MM B9+MARG 1.1-2 CM: CPT | Performed by: SURGERY

## 2020-10-13 DEVICE — BARD MESH PERFIX PLUG, MEDIUM
Type: IMPLANTABLE DEVICE | Site: GROIN | Status: FUNCTIONAL
Brand: BARD MESH PERFIX PLUG

## 2020-10-13 RX ORDER — CEFAZOLIN SODIUM 1 G/50ML
1000 SOLUTION INTRAVENOUS ONCE
Status: COMPLETED | OUTPATIENT
Start: 2020-10-13 | End: 2020-10-13

## 2020-10-13 RX ORDER — OXYCODONE HYDROCHLORIDE AND ACETAMINOPHEN 5; 325 MG/1; MG/1
2 TABLET ORAL EVERY 4 HOURS PRN
Status: DISCONTINUED | OUTPATIENT
Start: 2020-10-13 | End: 2020-10-13 | Stop reason: HOSPADM

## 2020-10-13 RX ORDER — GLYCOPYRROLATE 0.2 MG/ML
INJECTION INTRAMUSCULAR; INTRAVENOUS AS NEEDED
Status: DISCONTINUED | OUTPATIENT
Start: 2020-10-13 | End: 2020-10-13

## 2020-10-13 RX ORDER — HEPARIN SODIUM 5000 [USP'U]/ML
5000 INJECTION, SOLUTION INTRAVENOUS; SUBCUTANEOUS ONCE
Status: COMPLETED | OUTPATIENT
Start: 2020-10-13 | End: 2020-10-13

## 2020-10-13 RX ORDER — MEPERIDINE HYDROCHLORIDE 25 MG/ML
12.5 INJECTION INTRAMUSCULAR; INTRAVENOUS; SUBCUTANEOUS ONCE AS NEEDED
Status: DISCONTINUED | OUTPATIENT
Start: 2020-10-13 | End: 2020-10-13 | Stop reason: HOSPADM

## 2020-10-13 RX ORDER — PROPOFOL 10 MG/ML
INJECTION, EMULSION INTRAVENOUS AS NEEDED
Status: DISCONTINUED | OUTPATIENT
Start: 2020-10-13 | End: 2020-10-13

## 2020-10-13 RX ORDER — DEXAMETHASONE SODIUM PHOSPHATE 4 MG/ML
INJECTION, SOLUTION INTRA-ARTICULAR; INTRALESIONAL; INTRAMUSCULAR; INTRAVENOUS; SOFT TISSUE AS NEEDED
Status: DISCONTINUED | OUTPATIENT
Start: 2020-10-13 | End: 2020-10-13

## 2020-10-13 RX ORDER — CHLORHEXIDINE GLUCONATE 4 G/100ML
SOLUTION TOPICAL DAILY PRN
Status: DISCONTINUED | OUTPATIENT
Start: 2020-10-13 | End: 2020-10-13 | Stop reason: HOSPADM

## 2020-10-13 RX ORDER — SODIUM CHLORIDE, SODIUM LACTATE, POTASSIUM CHLORIDE, CALCIUM CHLORIDE 600; 310; 30; 20 MG/100ML; MG/100ML; MG/100ML; MG/100ML
125 INJECTION, SOLUTION INTRAVENOUS CONTINUOUS
Status: DISCONTINUED | OUTPATIENT
Start: 2020-10-13 | End: 2020-10-13 | Stop reason: HOSPADM

## 2020-10-13 RX ORDER — SODIUM CHLORIDE, SODIUM LACTATE, POTASSIUM CHLORIDE, CALCIUM CHLORIDE 600; 310; 30; 20 MG/100ML; MG/100ML; MG/100ML; MG/100ML
50 INJECTION, SOLUTION INTRAVENOUS CONTINUOUS
Status: DISCONTINUED | OUTPATIENT
Start: 2020-10-13 | End: 2020-10-13 | Stop reason: HOSPADM

## 2020-10-13 RX ORDER — ROCURONIUM BROMIDE 10 MG/ML
INJECTION, SOLUTION INTRAVENOUS AS NEEDED
Status: DISCONTINUED | OUTPATIENT
Start: 2020-10-13 | End: 2020-10-13

## 2020-10-13 RX ORDER — KETOROLAC TROMETHAMINE 30 MG/ML
INJECTION, SOLUTION INTRAMUSCULAR; INTRAVENOUS AS NEEDED
Status: DISCONTINUED | OUTPATIENT
Start: 2020-10-13 | End: 2020-10-13

## 2020-10-13 RX ORDER — FENTANYL CITRATE 50 UG/ML
INJECTION, SOLUTION INTRAMUSCULAR; INTRAVENOUS AS NEEDED
Status: DISCONTINUED | OUTPATIENT
Start: 2020-10-13 | End: 2020-10-13

## 2020-10-13 RX ORDER — MORPHINE SULFATE 4 MG/ML
2 INJECTION, SOLUTION INTRAMUSCULAR; INTRAVENOUS EVERY 4 HOURS PRN
Status: DISCONTINUED | OUTPATIENT
Start: 2020-10-13 | End: 2020-10-13 | Stop reason: HOSPADM

## 2020-10-13 RX ORDER — BUPIVACAINE HYDROCHLORIDE 5 MG/ML
INJECTION, SOLUTION PERINEURAL AS NEEDED
Status: DISCONTINUED | OUTPATIENT
Start: 2020-10-13 | End: 2020-10-13 | Stop reason: HOSPADM

## 2020-10-13 RX ORDER — NEOSTIGMINE METHYLSULFATE 1 MG/ML
INJECTION INTRAVENOUS AS NEEDED
Status: DISCONTINUED | OUTPATIENT
Start: 2020-10-13 | End: 2020-10-13

## 2020-10-13 RX ORDER — LIDOCAINE HYDROCHLORIDE 10 MG/ML
INJECTION, SOLUTION EPIDURAL; INFILTRATION; INTRACAUDAL; PERINEURAL AS NEEDED
Status: DISCONTINUED | OUTPATIENT
Start: 2020-10-13 | End: 2020-10-13

## 2020-10-13 RX ORDER — FENTANYL CITRATE/PF 50 MCG/ML
50 SYRINGE (ML) INJECTION
Status: DISCONTINUED | OUTPATIENT
Start: 2020-10-13 | End: 2020-10-13 | Stop reason: HOSPADM

## 2020-10-13 RX ORDER — ONDANSETRON 2 MG/ML
4 INJECTION INTRAMUSCULAR; INTRAVENOUS EVERY 8 HOURS PRN
Status: DISCONTINUED | OUTPATIENT
Start: 2020-10-13 | End: 2020-10-13 | Stop reason: HOSPADM

## 2020-10-13 RX ORDER — ONDANSETRON 2 MG/ML
4 INJECTION INTRAMUSCULAR; INTRAVENOUS ONCE AS NEEDED
Status: DISCONTINUED | OUTPATIENT
Start: 2020-10-13 | End: 2020-10-13 | Stop reason: HOSPADM

## 2020-10-13 RX ORDER — LIDOCAINE HYDROCHLORIDE 10 MG/ML
0.5 INJECTION, SOLUTION EPIDURAL; INFILTRATION; INTRACAUDAL; PERINEURAL ONCE AS NEEDED
Status: DISCONTINUED | OUTPATIENT
Start: 2020-10-13 | End: 2020-10-13 | Stop reason: HOSPADM

## 2020-10-13 RX ORDER — ONDANSETRON 2 MG/ML
INJECTION INTRAMUSCULAR; INTRAVENOUS AS NEEDED
Status: DISCONTINUED | OUTPATIENT
Start: 2020-10-13 | End: 2020-10-13

## 2020-10-13 RX ORDER — OXYCODONE HYDROCHLORIDE AND ACETAMINOPHEN 5; 325 MG/1; MG/1
1 TABLET ORAL EVERY 4 HOURS PRN
Qty: 20 TABLET | Refills: 0 | Status: ON HOLD | OUTPATIENT
Start: 2020-10-13 | End: 2021-02-04

## 2020-10-13 RX ORDER — MIDAZOLAM HYDROCHLORIDE 2 MG/2ML
INJECTION, SOLUTION INTRAMUSCULAR; INTRAVENOUS AS NEEDED
Status: DISCONTINUED | OUTPATIENT
Start: 2020-10-13 | End: 2020-10-13

## 2020-10-13 RX ADMIN — FENTANYL CITRATE 50 MCG: 50 INJECTION, SOLUTION INTRAMUSCULAR; INTRAVENOUS at 08:55

## 2020-10-13 RX ADMIN — GLYCOPYRROLATE 0.4 MG: 0.2 INJECTION, SOLUTION INTRAMUSCULAR; INTRAVENOUS at 09:35

## 2020-10-13 RX ADMIN — FENTANYL CITRATE 50 MCG: 50 INJECTION, SOLUTION INTRAMUSCULAR; INTRAVENOUS at 10:24

## 2020-10-13 RX ADMIN — SODIUM CHLORIDE, SODIUM LACTATE, POTASSIUM CHLORIDE, AND CALCIUM CHLORIDE 125 ML/HR: .6; .31; .03; .02 INJECTION, SOLUTION INTRAVENOUS at 06:55

## 2020-10-13 RX ADMIN — HEPARIN SODIUM 5000 UNITS: 5000 INJECTION INTRAVENOUS; SUBCUTANEOUS at 07:00

## 2020-10-13 RX ADMIN — FENTANYL CITRATE 50 MCG: 50 INJECTION, SOLUTION INTRAMUSCULAR; INTRAVENOUS at 09:58

## 2020-10-13 RX ADMIN — ONDANSETRON HYDROCHLORIDE 4 MG: 2 INJECTION, SOLUTION INTRAVENOUS at 07:43

## 2020-10-13 RX ADMIN — SODIUM CHLORIDE, SODIUM LACTATE, POTASSIUM CHLORIDE, AND CALCIUM CHLORIDE: .6; .31; .03; .02 INJECTION, SOLUTION INTRAVENOUS at 08:58

## 2020-10-13 RX ADMIN — ROCURONIUM BROMIDE 50 MG: 50 INJECTION, SOLUTION INTRAVENOUS at 07:41

## 2020-10-13 RX ADMIN — FENTANYL CITRATE 100 MCG: 50 INJECTION, SOLUTION INTRAMUSCULAR; INTRAVENOUS at 07:41

## 2020-10-13 RX ADMIN — CEFAZOLIN SODIUM 1000 MG: 1 SOLUTION INTRAVENOUS at 07:37

## 2020-10-13 RX ADMIN — LIDOCAINE HYDROCHLORIDE 50 MG: 10 INJECTION, SOLUTION EPIDURAL; INFILTRATION; INTRACAUDAL; PERINEURAL at 07:41

## 2020-10-13 RX ADMIN — MIDAZOLAM HYDROCHLORIDE 2 MG: 1 INJECTION, SOLUTION INTRAMUSCULAR; INTRAVENOUS at 07:37

## 2020-10-13 RX ADMIN — PROPOFOL 200 MG: 10 INJECTION, EMULSION INTRAVENOUS at 07:41

## 2020-10-13 RX ADMIN — NEOSTIGMINE METHYLSULFATE 3 MG: 1 INJECTION INTRAMUSCULAR; INTRAVENOUS; SUBCUTANEOUS at 09:35

## 2020-10-13 RX ADMIN — FENTANYL CITRATE 50 MCG: 50 INJECTION, SOLUTION INTRAMUSCULAR; INTRAVENOUS at 10:19

## 2020-10-13 RX ADMIN — DEXAMETHASONE SODIUM PHOSPHATE 4 MG: 4 INJECTION, SOLUTION INTRAMUSCULAR; INTRAVENOUS at 07:43

## 2020-10-13 RX ADMIN — KETOROLAC TROMETHAMINE 30 MG: 30 INJECTION, SOLUTION INTRAMUSCULAR; INTRAVENOUS at 09:35

## 2020-10-29 ENCOUNTER — OFFICE VISIT (OUTPATIENT)
Dept: SURGERY | Facility: CLINIC | Age: 40
End: 2020-10-29

## 2020-10-29 VITALS
HEART RATE: 98 BPM | BODY MASS INDEX: 24.96 KG/M2 | WEIGHT: 159 LBS | TEMPERATURE: 98 F | DIASTOLIC BLOOD PRESSURE: 77 MMHG | HEIGHT: 67 IN | SYSTOLIC BLOOD PRESSURE: 105 MMHG

## 2020-10-29 DIAGNOSIS — G89.18 POST-OP PAIN: Primary | ICD-10-CM

## 2020-10-29 DIAGNOSIS — K42.9 UMBILICAL HERNIA WITHOUT OBSTRUCTION AND WITHOUT GANGRENE: ICD-10-CM

## 2020-10-29 DIAGNOSIS — K40.90 RIGHT INGUINAL HERNIA: ICD-10-CM

## 2020-10-29 PROCEDURE — 99024 POSTOP FOLLOW-UP VISIT: CPT | Performed by: SURGERY

## 2020-10-29 RX ORDER — GABAPENTIN 100 MG/1
100 CAPSULE ORAL 3 TIMES DAILY
Qty: 90 CAPSULE | Refills: 1 | Status: ON HOLD | OUTPATIENT
Start: 2020-10-29 | End: 2021-02-04

## 2020-11-01 PROBLEM — R10.33 PERIUMBILICAL ABDOMINAL PAIN: Status: RESOLVED | Noted: 2020-08-07 | Resolved: 2020-11-01

## 2020-11-01 PROBLEM — K40.90 RIGHT INGUINAL HERNIA: Status: ACTIVE | Noted: 2020-11-01

## 2020-11-06 ENCOUNTER — TELEPHONE (OUTPATIENT)
Dept: SURGERY | Facility: CLINIC | Age: 40
End: 2020-11-06

## 2020-11-06 DIAGNOSIS — R10.31 RIGHT GROIN PAIN: Primary | ICD-10-CM

## 2020-11-06 DIAGNOSIS — G89.18 POST-OP PAIN: Primary | ICD-10-CM

## 2020-11-06 RX ORDER — OXYCODONE HYDROCHLORIDE 5 MG/1
5 TABLET ORAL EVERY 4 HOURS PRN
Qty: 20 TABLET | Refills: 0 | Status: SHIPPED | OUTPATIENT
Start: 2020-11-06 | End: 2020-11-16 | Stop reason: SDUPTHER

## 2020-11-06 RX ORDER — NALOXONE HYDROCHLORIDE 4 MG/.1ML
SPRAY NASAL
Qty: 1 EACH | Refills: 0 | Status: ON HOLD | OUTPATIENT
Start: 2020-11-06 | End: 2021-02-04

## 2020-11-12 ENCOUNTER — HOSPITAL ENCOUNTER (OUTPATIENT)
Dept: CT IMAGING | Facility: HOSPITAL | Age: 40
Discharge: HOME | End: 2020-11-12
Attending: SURGERY
Payer: COMMERCIAL

## 2020-11-12 DIAGNOSIS — R10.31 RIGHT GROIN PAIN: ICD-10-CM

## 2020-11-12 PROCEDURE — G1004 CDSM NDSC: HCPCS

## 2020-11-12 PROCEDURE — 72193 CT PELVIS W/DYE: CPT

## 2020-11-12 RX ADMIN — IOHEXOL 100 ML: 350 INJECTION, SOLUTION INTRAVENOUS at 13:16

## 2020-11-16 DIAGNOSIS — G89.18 POST-OP PAIN: ICD-10-CM

## 2020-11-16 RX ORDER — OXYCODONE HYDROCHLORIDE 5 MG/1
5 TABLET ORAL EVERY 4 HOURS PRN
Qty: 20 TABLET | Refills: 0 | Status: SHIPPED | OUTPATIENT
Start: 2020-11-16 | End: 2020-12-03 | Stop reason: SDUPTHER

## 2020-11-24 ENCOUNTER — TELEPHONE (OUTPATIENT)
Dept: PAIN MEDICINE | Facility: CLINIC | Age: 40
End: 2020-11-24

## 2020-11-25 ENCOUNTER — TELEPHONE (OUTPATIENT)
Dept: GASTROENTEROLOGY | Facility: CLINIC | Age: 40
End: 2020-11-25

## 2020-12-03 ENCOUNTER — OFFICE VISIT (OUTPATIENT)
Dept: SURGERY | Facility: CLINIC | Age: 40
End: 2020-12-03

## 2020-12-03 VITALS
WEIGHT: 161 LBS | HEIGHT: 67 IN | DIASTOLIC BLOOD PRESSURE: 74 MMHG | SYSTOLIC BLOOD PRESSURE: 106 MMHG | TEMPERATURE: 98.3 F | BODY MASS INDEX: 25.27 KG/M2 | HEART RATE: 118 BPM

## 2020-12-03 DIAGNOSIS — G89.18 POST-OP PAIN: ICD-10-CM

## 2020-12-03 DIAGNOSIS — K40.90 RIGHT INGUINAL HERNIA: Primary | ICD-10-CM

## 2020-12-03 PROCEDURE — 99024 POSTOP FOLLOW-UP VISIT: CPT | Performed by: SURGERY

## 2020-12-03 RX ORDER — OXYCODONE HYDROCHLORIDE 5 MG/1
5 TABLET ORAL EVERY 4 HOURS PRN
Qty: 20 TABLET | Refills: 0 | Status: SHIPPED | OUTPATIENT
Start: 2020-12-03 | End: 2020-12-15 | Stop reason: SDUPTHER

## 2020-12-06 PROBLEM — K40.90 RIGHT INGUINAL HERNIA: Status: RESOLVED | Noted: 2020-11-01 | Resolved: 2020-12-06

## 2020-12-15 ENCOUNTER — TELEPHONE (OUTPATIENT)
Dept: GASTROENTEROLOGY | Facility: CLINIC | Age: 40
End: 2020-12-15

## 2020-12-15 DIAGNOSIS — G89.18 POST-OP PAIN: ICD-10-CM

## 2020-12-15 RX ORDER — OXYCODONE HYDROCHLORIDE 5 MG/1
5 TABLET ORAL EVERY 4 HOURS PRN
Qty: 20 TABLET | Refills: 0 | Status: ON HOLD | OUTPATIENT
Start: 2020-12-15 | End: 2021-02-04

## 2020-12-21 ENCOUNTER — CONSULT (OUTPATIENT)
Dept: PAIN MEDICINE | Facility: CLINIC | Age: 40
End: 2020-12-21
Payer: COMMERCIAL

## 2020-12-21 VITALS
DIASTOLIC BLOOD PRESSURE: 76 MMHG | BODY MASS INDEX: 24.39 KG/M2 | SYSTOLIC BLOOD PRESSURE: 126 MMHG | HEIGHT: 67 IN | WEIGHT: 155.4 LBS

## 2020-12-21 DIAGNOSIS — R10.31 RIGHT GROIN PAIN: ICD-10-CM

## 2020-12-21 DIAGNOSIS — G89.4 CHRONIC PAIN SYNDROME: ICD-10-CM

## 2020-12-21 DIAGNOSIS — G62.9 NEUROPATHY: Primary | ICD-10-CM

## 2020-12-21 DIAGNOSIS — Z79.891 LONG-TERM CURRENT USE OF OPIATE ANALGESIC: ICD-10-CM

## 2020-12-21 DIAGNOSIS — F11.20 UNCOMPLICATED OPIOID DEPENDENCE (HCC): ICD-10-CM

## 2020-12-21 PROCEDURE — 3008F BODY MASS INDEX DOCD: CPT | Performed by: ANESTHESIOLOGY

## 2020-12-21 PROCEDURE — 99244 OFF/OP CNSLTJ NEW/EST MOD 40: CPT | Performed by: ANESTHESIOLOGY

## 2020-12-21 RX ORDER — AMITRIPTYLINE HYDROCHLORIDE 25 MG/1
25 TABLET, FILM COATED ORAL
Qty: 30 TABLET | Refills: 0 | Status: ON HOLD | OUTPATIENT
Start: 2020-12-21 | End: 2021-02-04
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
## 2020-12-22 ENCOUNTER — PREP FOR PROCEDURE (OUTPATIENT)
Dept: PAIN MEDICINE | Facility: CLINIC | Age: 40
End: 2020-12-22

## 2020-12-22 DIAGNOSIS — G62.9 NEUROPATHY: Primary | ICD-10-CM

## 2020-12-22 DIAGNOSIS — R10.31 RIGHT GROIN PAIN: ICD-10-CM

## 2020-12-23 LAB
AMPHET SAL QL CFM: NEGATIVE NG/ML
BUPRENORPHINE SAL QL SCN: NEGATIVE NG/ML
CARBOXYTHC SAL QL CFM: NEGATIVE NG/ML
CCP IGG SERPL-ACNC: NEGATIVE
COCAINE SAL QL CFM: NEGATIVE NG/ML
CODEINE SAL QL CFM: NEGATIVE NG/ML
EDDP SAL QL CFM: NEGATIVE NG/ML
HYDROCODONE SAL QL CFM: NEGATIVE NG/ML
HYDROCODONE SAL QL CFM: NEGATIVE NG/ML
HYDROMORPHONE SAL QL CFM: NEGATIVE NG/ML
LEUKEMIA MARKERS BLD-IMP: NEGATIVE NG/ML
M PROTEIN 3 UR ELPH-MCNC: NORMAL NG/ML
M TB TUBERC IGNF/MITOGEN IGNF CONTROL: NEGATIVE NG/ML
METHADONE SAL QL CFM: NEGATIVE NG/ML
MORPHINE SAL QL CFM: NEGATIVE NG/ML
MORPHINE SAL QL CFM: NEGATIVE NG/ML
OXYMORPHONE SAL QL CFM: NEGATIVE NG/ML
OXYMORPHONE SAL QL CFM: NEGATIVE NG/ML
PCP SAL QL CFM: NEGATIVE NG/ML
RESULT ALL_PRESCRIBED MEDS AND SPECIAL INSTRUCTIONS: NORMAL
SL AMB 6-MAM (HEROIN METABOLITE) QUANTIFICATION: NEGATIVE NG/ML
SL AMB ALPRAZOLAM QUANTIFICATION: NEGATIVE NG/ML
SL AMB ATOMOXETINE METABOLITE QUANTIFICATION: NEGATIVE
SL AMB ATOMOXETINE QUANTIFICATION: NEGATIVE
SL AMB CLONAZEPAM QUANTIFICATION: NEGATIVE NG/ML
SL AMB DIAZEPAM QUANTIFICATION: NEGATIVE NG/ML
SL AMB FENTANYL QUANTIFICATION: NEGATIVE NG/ML
SL AMB MDMA QUANTIFICATION: NEGATIVE NG/ML
SL AMB N-DESMETHYL-TRAMADOL QUANTIFICATION SALIVA: NEGATIVE NG/ML
SL AMB NORBUPRENORPHINE QUANTIFICATION: NEGATIVE NG/ML
SL AMB NORDIAZEPAM QUANTIFICATION: NEGATIVE NG/ML
SL AMB NORFENTANYL QUANTIFICATION: NEGATIVE NG/ML
SL AMB NORHYDROCODONE QUANTIFICATION: NEGATIVE NG/ML
SL AMB NORHYDROCODONE QUANTIFICATION: NEGATIVE NG/ML
SL AMB NORMEPERIDINE QUANTIFICATION: NEGATIVE NG/ML
SL AMB NOROXYCODONE QUANTIFICATION: NORMAL NG/ML
SL AMB O-DESMETHYL-TRAMADOL QUANTIFICATION: NEGATIVE NG/ML
SL AMB OXAZEPAM QUANTIFICATION: NEGATIVE NG/ML
SL AMB RITALINIC ACID QUANTIFICATION: NEGATIVE
SL AMB TEMAZEPAM QUANTIFICATION: NEGATIVE NG/ML
SL AMB TEMAZEPAM QUANTIFICATION: NEGATIVE NG/ML
SL AMB TRAMADOL QUANTIFICATION: NEGATIVE NG/ML
SQUAMOUS #/AREA URNS HPF: NEGATIVE NG/ML

## 2021-01-18 ENCOUNTER — ANESTHESIA EVENT (OUTPATIENT)
Dept: PERIOP | Facility: HOSPITAL | Age: 41
End: 2021-01-18
Payer: COMMERCIAL

## 2021-01-19 ENCOUNTER — HOSPITAL ENCOUNTER (OUTPATIENT)
Facility: HOSPITAL | Age: 41
Setting detail: OUTPATIENT SURGERY
Discharge: HOME | End: 2021-01-19
Attending: ANESTHESIOLOGY | Admitting: ANESTHESIOLOGY
Payer: COMMERCIAL

## 2021-01-19 ENCOUNTER — ANESTHESIA (OUTPATIENT)
Dept: PERIOP | Facility: HOSPITAL | Age: 41
End: 2021-01-19
Payer: COMMERCIAL

## 2021-01-19 VITALS
WEIGHT: 144 LBS | SYSTOLIC BLOOD PRESSURE: 113 MMHG | RESPIRATION RATE: 18 BRPM | HEART RATE: 65 BPM | TEMPERATURE: 96.8 F | BODY MASS INDEX: 23.14 KG/M2 | HEIGHT: 66 IN | OXYGEN SATURATION: 98 % | DIASTOLIC BLOOD PRESSURE: 58 MMHG

## 2021-01-19 VITALS — HEART RATE: 79 BPM

## 2021-01-19 PROCEDURE — 64486 TAP BLOCK UNIL BY INJECTION: CPT | Performed by: ANESTHESIOLOGY

## 2021-01-19 RX ORDER — LIDOCAINE HYDROCHLORIDE 10 MG/ML
INJECTION, SOLUTION EPIDURAL; INFILTRATION; INTRACAUDAL; PERINEURAL AS NEEDED
Status: DISCONTINUED | OUTPATIENT
Start: 2021-01-19 | End: 2021-01-19

## 2021-01-19 RX ORDER — BUPIVACAINE HYDROCHLORIDE 2.5 MG/ML
INJECTION, SOLUTION EPIDURAL; INFILTRATION; INTRACAUDAL AS NEEDED
Status: DISCONTINUED | OUTPATIENT
Start: 2021-01-19 | End: 2021-01-19 | Stop reason: HOSPADM

## 2021-01-19 RX ORDER — FENTANYL CITRATE 50 UG/ML
INJECTION, SOLUTION INTRAMUSCULAR; INTRAVENOUS AS NEEDED
Status: DISCONTINUED | OUTPATIENT
Start: 2021-01-19 | End: 2021-01-19

## 2021-01-19 RX ORDER — PROPOFOL 10 MG/ML
INJECTION, EMULSION INTRAVENOUS AS NEEDED
Status: DISCONTINUED | OUTPATIENT
Start: 2021-01-19 | End: 2021-01-19

## 2021-01-19 RX ORDER — SODIUM CHLORIDE, SODIUM LACTATE, POTASSIUM CHLORIDE, CALCIUM CHLORIDE 600; 310; 30; 20 MG/100ML; MG/100ML; MG/100ML; MG/100ML
INJECTION, SOLUTION INTRAVENOUS CONTINUOUS PRN
Status: DISCONTINUED | OUTPATIENT
Start: 2021-01-19 | End: 2021-01-19

## 2021-01-19 RX ORDER — DEXAMETHASONE SODIUM PHOSPHATE 4 MG/ML
INJECTION, SOLUTION INTRA-ARTICULAR; INTRALESIONAL; INTRAMUSCULAR; INTRAVENOUS; SOFT TISSUE AS NEEDED
Status: DISCONTINUED | OUTPATIENT
Start: 2021-01-19 | End: 2021-01-19 | Stop reason: HOSPADM

## 2021-01-19 RX ORDER — MIDAZOLAM HYDROCHLORIDE 2 MG/2ML
INJECTION, SOLUTION INTRAMUSCULAR; INTRAVENOUS AS NEEDED
Status: DISCONTINUED | OUTPATIENT
Start: 2021-01-19 | End: 2021-01-19

## 2021-01-19 RX ADMIN — MIDAZOLAM HYDROCHLORIDE 2 MG: 1 INJECTION, SOLUTION INTRAMUSCULAR; INTRAVENOUS at 09:24

## 2021-01-19 RX ADMIN — PROPOFOL 50 MG: 10 INJECTION, EMULSION INTRAVENOUS at 09:35

## 2021-01-19 RX ADMIN — PROPOFOL 100 MG: 10 INJECTION, EMULSION INTRAVENOUS at 09:29

## 2021-01-19 RX ADMIN — PROPOFOL 50 MG: 10 INJECTION, EMULSION INTRAVENOUS at 09:39

## 2021-01-19 RX ADMIN — SODIUM CHLORIDE, SODIUM LACTATE, POTASSIUM CHLORIDE, AND CALCIUM CHLORIDE: .6; .31; .03; .02 INJECTION, SOLUTION INTRAVENOUS at 08:55

## 2021-01-19 RX ADMIN — FENTANYL CITRATE 100 MCG: 50 INJECTION, SOLUTION INTRAMUSCULAR; INTRAVENOUS at 09:25

## 2021-01-19 RX ADMIN — LIDOCAINE HYDROCHLORIDE 50 MG: 10 INJECTION, SOLUTION EPIDURAL; INFILTRATION; INTRACAUDAL; PERINEURAL at 09:29

## 2021-01-19 RX ADMIN — PROPOFOL 50 MG: 10 INJECTION, EMULSION INTRAVENOUS at 09:41

## 2021-01-19 NOTE — INTERVAL H&P NOTE
H&P reviewed  After examining the patient I find no changes in the patients condition since the H&P had been written      Vitals:    01/19/21 0902   BP: 126/64   Pulse: 80   Resp: 18   Temp: 98 2 °F (36 8 °C)   SpO2: 100%

## 2021-01-19 NOTE — OP NOTE
OPERATIVE REPORT  PATIENT NAME: Jagdish Diaz    :  1980  MRN: [id number]  Pt Location: [facility]    SURGERY DATE: 2021    Surgeon(s) and Role:     * Sharad Renteria MD - Primary    Preop Diagnosis:  Neuropathy [G62 9]  Right groin pain [R10 31]    Post-Op Diagnosis Codes:     * Neuropathy [G62 9]     * Right groin pain [R10 31]    Procedure(s) (LRB):  ULTRASOUND-GUIDED RIGHT GENITOFEMORAL AND ILIOINGUINAL NERVE BLOCK VERSUS TAP BLOCK WITH ANESTHESIA  (Right)    Specimen(s):  * No specimens in log *    Estimated Blood Loss:   Minimal    Drains:  * No LDAs found *    Anesthesia Type:   IV Sedation with Anesthesia    Operative Indications:  Neuropathy [G62 9]  Right groin pain [R10 31]      Operative Findings:  same    Complications:   None    Procedure and Technique:  Ultrasound guided right TAP (transverse abdominal plane) block     After discussing the risks, benefits, and alternatives to the procedure, the patient expressed understanding and wished to proceed  The patient was brought to the procedure suite and placed in the lateral with the side facing upwards  A procedural pause was conducted to verify: Correct patient identity, procedure to be performed and as applicable, correct side and site, correct patient position, and availability of implants, special equipment or special requirements  At approximately 5 cm from the mid axial position and 4 cm superior to the iliac crest A 12-4MHz linear ultrasound probe was used to identify internal, external obliques and transverse abdominis muscles  A 2 5 inch 25-gauge needle was advanced down to through each muscle layer stopping superior to the transverse abdominal some muscle fascia  After negative aspiration, 40 mg of Depo-Medrol in 13 mL of 0 25% bupivacaine was injected in the region of the intercostal nerves  The needle was then extracted   Postprocedure, the patient denied any abrupt worsening abdominal pain and noted paresthesia of the left abdominal wall in normal sensation of the right abdominal wall  The patient tolerated the procedure well and there were no apparent complications  The patient was dismissed from the recovery area in stable condition  The patient was advised not to travel in an airplane within the next 24 hours       I was present for the entire procedure    Patient Disposition:  hemodynamically stable    SIGNATURE: Nuria Stoll MD  DATE: January 19, 2021  TIME: 4:10 PM

## 2021-01-19 NOTE — ANESTHESIA PREPROCEDURE EVALUATION
Procedure:  ULTRASOUND-GUIDED RIGHT GENITOFEMORAL AND ILIOINGUINAL NERVE BLOCK VERSUS TAP BLOCK WITH ANESTHESIA  (Right Abdomen)    Relevant Problems   /RENAL   (+) Kidney stones      MUSCULOSKELETAL   (+) Acute bilateral low back pain without sciatica      NEURO/PSYCH   (+) Anxiety   (+) History of Leo's esophagus   (+) History of recurrent UTIs      S/p hysterectomy         Anesthesia Plan  ASA Score- 2     Anesthesia Type- IV sedation with anesthesia with ASA Monitors           Additional Monitors:   Airway Plan:           Plan Factors-    Induction-     Postoperative Plan-     Informed Consent-

## 2021-01-19 NOTE — DISCHARGE INSTRUCTIONS
Cigarette Smoking and Your Health   AMBULATORY CARE:   Risks to your health if you smoke:  Nicotine and other chemicals found in tobacco and e-cigarettes can damage every cell in your body  Even if you are a light smoker, you have an increased risk for cancer, heart disease, and lung disease  If you are pregnant or have diabetes, smoking increases your risk for complications  Nicotine can affect an adolescent's developing brain  This can lead to trouble thinking, learning, or paying attention  Benefits to your health if you stop smoking: You decrease respiratory symptoms such as coughing, wheezing, and shortness of breath  You reduce your risk for cancers of the lung, mouth, throat, kidney, bladder, pancreas, stomach, and cervix  If you already have cancer, you increase the benefits of chemotherapy  You also reduce your risk for cancer returning or a second cancer from developing  You reduce your risk for heart disease, blood clots, heart attack, and stroke  You reduce your risk for lung infections, and diseases such as pneumonia, asthma, chronic bronchitis, and emphysema  Your circulation improves  More oxygen can be delivered to your body  If you have diabetes, you lower your risk for complications, such as kidney, artery, and eye diseases  You also lower your risk for nerve damage  Nerve damage can lead to amputations, poor vision, and blindness  You improve your body's ability to heal and to fight infections  An adolescent can help his or her brain and body develop in a healthy way  Talk to your adolescent about all the health risks of nicotine  If you can, start talking about nicotine when your child is younger than 12 years  This may make it easier for him or her not to start using nicotine as a teenager or adult  Explain to him or her that it is best never to start  It can be hard to try to quit later      Benefits to the health of others if you stop smoking:  Tobacco is harmful to nonsmokers who breathe in your secondhand smoke  The following are ways the health of others around you may improve when you stop smoking: You lower the risks for lung cancer and heart disease in nonsmoking adults  If you are pregnant, you lower the risk for miscarriage, early delivery, low birth weight, and stillbirth  You also lower your baby's risk for SIDS, obesity, developmental delay, and neurobehavioral problems, such as ADHD  If you have children, you lower their risk for ear infections, colds, pneumonia, bronchitis, and asthma  Follow up with your doctor as directed:  Write down your questions so you remember to ask them during your visits  For more information and support to stop smoking:   Smokefree  gov  Phone: 8- 814 - 617-6133  Web Address: [employer]  [place] [address] Information is for End User's use only and may not be sold, redistributed or otherwise used for commercial purposes  All illustrations and images included in CareNotes® are the copyrighted property of [employer]  or [employer]   The above information is an  only  It is not intended as medical advice for individual conditions or treatments  Talk to your doctor, nurse or pharmacist before following any medical regimen to see if it is safe and effective for you  1  Do not apply heat to any area that is numb  If you have discomfort or soreness at the injection site, you may apply ice today, 20 minutes on and 20 minutes off  Tomorrow you may use ice or warm, moist heat  Do not apply ice or heat directly to the skin  2  If you experience severe shortness of breath, go to the Emergency Room  3  You may have numbness for several hours from the local anesthetic  Please use caution and common sense, especially with weight-bearing activities  4  You may have an increase or change in the discomfort for 36-48 hours after your treatment   Apply ice and continue with any pain medicine you have been prescribed  5  Do not do anything strenuous today  You may shower, but no tub baths or hot tubs today  You may resume your normal activities tomorrow, but do not overdo it  Resume normal activities slowly when you are feeling better  6  If you experience redness, drainage or swelling at the injection site, or if you develop a fever above 100 degrees, please call [facility] at [phone] or go to the Emergency Room  7  Continue to take all routine medicines prescribed by your primary care physician unless otherwise instructed by our staff  Most blood thinners should be started again according to your regularly scheduled dosing  If you have any questions, please give our office a call  If you have a problem specifically related to your procedure, please call our office at [phone]  Problems not related to your procedure should be directed to your primary care physician

## 2021-01-20 ENCOUNTER — PREP FOR PROCEDURE (OUTPATIENT)
Dept: PAIN MEDICINE | Facility: CLINIC | Age: 41
End: 2021-01-20

## 2021-01-20 DIAGNOSIS — R10.31 RIGHT GROIN PAIN: ICD-10-CM

## 2021-01-20 DIAGNOSIS — G62.9 NEUROPATHY: Primary | ICD-10-CM

## 2021-01-26 ENCOUNTER — TELEPHONE (OUTPATIENT)
Dept: PAIN MEDICINE | Facility: CLINIC | Age: 41
End: 2021-01-26

## 2021-02-04 ENCOUNTER — HOSPITAL ENCOUNTER (OUTPATIENT)
Facility: HOSPITAL | Age: 41
Setting detail: OUTPATIENT SURGERY
Discharge: HOME | End: 2021-02-04
Attending: ANESTHESIOLOGY | Admitting: ANESTHESIOLOGY
Payer: COMMERCIAL

## 2021-02-04 ENCOUNTER — ANESTHESIA (OUTPATIENT)
Dept: PERIOP | Facility: HOSPITAL | Age: 41
End: 2021-02-04
Payer: COMMERCIAL

## 2021-02-04 ENCOUNTER — ANESTHESIA EVENT (OUTPATIENT)
Dept: PERIOP | Facility: HOSPITAL | Age: 41
End: 2021-02-04
Payer: COMMERCIAL

## 2021-02-04 VITALS
DIASTOLIC BLOOD PRESSURE: 70 MMHG | SYSTOLIC BLOOD PRESSURE: 120 MMHG | WEIGHT: 144 LBS | OXYGEN SATURATION: 98 % | TEMPERATURE: 96.8 F | HEIGHT: 66 IN | BODY MASS INDEX: 23.14 KG/M2 | HEART RATE: 90 BPM | RESPIRATION RATE: 27 BRPM

## 2021-02-04 VITALS — HEART RATE: 77 BPM

## 2021-02-04 PROBLEM — G62.9 NEUROPATHY: Status: ACTIVE | Noted: 2021-02-04

## 2021-02-04 PROCEDURE — 64454 NJX AA&/STRD GNCLR NRV BRNCH: CPT | Performed by: ANESTHESIOLOGY

## 2021-02-04 RX ORDER — PROPOFOL 10 MG/ML
INJECTION, EMULSION INTRAVENOUS AS NEEDED
Status: DISCONTINUED | OUTPATIENT
Start: 2021-02-04 | End: 2021-02-04

## 2021-02-04 RX ORDER — FENTANYL CITRATE 50 UG/ML
INJECTION, SOLUTION INTRAMUSCULAR; INTRAVENOUS AS NEEDED
Status: DISCONTINUED | OUTPATIENT
Start: 2021-02-04 | End: 2021-02-04

## 2021-02-04 RX ORDER — ONDANSETRON 2 MG/ML
4 INJECTION INTRAMUSCULAR; INTRAVENOUS ONCE AS NEEDED
Status: DISCONTINUED | OUTPATIENT
Start: 2021-02-04 | End: 2021-02-04 | Stop reason: HOSPADM

## 2021-02-04 RX ORDER — DEXAMETHASONE SODIUM PHOSPHATE 10 MG/ML
INJECTION, SOLUTION INTRAMUSCULAR; INTRAVENOUS AS NEEDED
Status: DISCONTINUED | OUTPATIENT
Start: 2021-02-04 | End: 2021-02-04 | Stop reason: HOSPADM

## 2021-02-04 RX ORDER — LIDOCAINE HYDROCHLORIDE 10 MG/ML
INJECTION, SOLUTION EPIDURAL; INFILTRATION; INTRACAUDAL; PERINEURAL AS NEEDED
Status: DISCONTINUED | OUTPATIENT
Start: 2021-02-04 | End: 2021-02-04 | Stop reason: HOSPADM

## 2021-02-04 RX ORDER — MIDAZOLAM HYDROCHLORIDE 2 MG/2ML
INJECTION, SOLUTION INTRAMUSCULAR; INTRAVENOUS AS NEEDED
Status: DISCONTINUED | OUTPATIENT
Start: 2021-02-04 | End: 2021-02-04

## 2021-02-04 RX ORDER — BUPIVACAINE HYDROCHLORIDE 5 MG/ML
INJECTION, SOLUTION EPIDURAL; INTRACAUDAL AS NEEDED
Status: DISCONTINUED | OUTPATIENT
Start: 2021-02-04 | End: 2021-02-04 | Stop reason: HOSPADM

## 2021-02-04 RX ORDER — SODIUM CHLORIDE, SODIUM LACTATE, POTASSIUM CHLORIDE, CALCIUM CHLORIDE 600; 310; 30; 20 MG/100ML; MG/100ML; MG/100ML; MG/100ML
125 INJECTION, SOLUTION INTRAVENOUS CONTINUOUS
Status: DISCONTINUED | OUTPATIENT
Start: 2021-02-04 | End: 2021-02-04 | Stop reason: HOSPADM

## 2021-02-04 RX ADMIN — PROPOFOL 20 MG: 10 INJECTION, EMULSION INTRAVENOUS at 10:11

## 2021-02-04 RX ADMIN — PROPOFOL 10 MG: 10 INJECTION, EMULSION INTRAVENOUS at 10:14

## 2021-02-04 RX ADMIN — PROPOFOL 10 MG: 10 INJECTION, EMULSION INTRAVENOUS at 10:12

## 2021-02-04 RX ADMIN — PROPOFOL 20 MG: 10 INJECTION, EMULSION INTRAVENOUS at 10:23

## 2021-02-04 RX ADMIN — PROPOFOL 30 MG: 10 INJECTION, EMULSION INTRAVENOUS at 10:10

## 2021-02-04 RX ADMIN — PROPOFOL 10 MG: 10 INJECTION, EMULSION INTRAVENOUS at 10:21

## 2021-02-04 RX ADMIN — FENTANYL CITRATE 100 MCG: 50 INJECTION, SOLUTION INTRAMUSCULAR; INTRAVENOUS at 10:08

## 2021-02-04 RX ADMIN — SODIUM CHLORIDE, SODIUM LACTATE, POTASSIUM CHLORIDE, AND CALCIUM CHLORIDE 125 ML/HR: .6; .31; .03; .02 INJECTION, SOLUTION INTRAVENOUS at 09:51

## 2021-02-04 RX ADMIN — PROPOFOL 20 MG: 10 INJECTION, EMULSION INTRAVENOUS at 10:29

## 2021-02-04 RX ADMIN — MIDAZOLAM HYDROCHLORIDE 2 MG: 1 INJECTION, SOLUTION INTRAMUSCULAR; INTRAVENOUS at 10:08

## 2021-02-04 RX ADMIN — PROPOFOL 10 MG: 10 INJECTION, EMULSION INTRAVENOUS at 10:20

## 2021-02-04 RX ADMIN — SODIUM CHLORIDE, SODIUM LACTATE, POTASSIUM CHLORIDE, AND CALCIUM CHLORIDE: .6; .31; .03; .02 INJECTION, SOLUTION INTRAVENOUS at 10:09

## 2021-02-04 RX ADMIN — PROPOFOL 10 MG: 10 INJECTION, EMULSION INTRAVENOUS at 10:15

## 2021-02-04 RX ADMIN — PROPOFOL 20 MG: 10 INJECTION, EMULSION INTRAVENOUS at 10:17

## 2021-02-04 NOTE — DISCHARGE INSTRUCTIONS
1  Do not apply heat to any area that is numb  If you have discomfort or soreness at the injection site, you may apply ice today, 20 minutes on and 20 minutes off  Tomorrow you may use ice or warm, moist heat  Do not apply ice or heat directly to the skin  2  If you experience severe shortness of breath, go to the Emergency Room  3  You may have numbness for several hours from the local anesthetic  Please use caution and common sense, especially with weight-bearing activities  4  You may have an increase or change in the discomfort for 36-48 hours after your treatment  Apply ice and continue with any pain medicine you have been prescribed  5  Do not do anything strenuous today  You may shower, but no tub baths or hot tubs today  You may resume your normal activities tomorrow, but do not overdo it  Resume normal activities slowly when you are feeling better  6  If you experience redness, drainage or swelling at the injection site, or if you develop a fever above 100 degrees, please call [facility] at [phone] or go to the Emergency Room  7  Continue to take all routine medicines prescribed by your primary care physician unless otherwise instructed by our staff  Most blood thinners should be started again according to your regularly scheduled dosing  If you have any questions, please give our office a call  If you have a problem specifically related to your procedure, please call our office at [phone]  Problems not related to your procedure should be directed to your primary care physician

## 2021-02-04 NOTE — OP NOTE
OPERATIVE REPORT  PATIENT NAME: Oni Cuenca    :  1980  MRN: [id number]  Pt Location: [facility]    SURGERY DATE: 2021    Surgeon(s) and Role:     * Doreen Delatorre MD - Primary    Preop Diagnosis:  Neuropathy [G62 9]  Right groin pain [R10 31]    Post-Op Diagnosis Codes:     * Neuropathy [G62 9]     * Right groin pain [R10 31]    Procedure(s) (LRB):  ULTRASOUND-GUIDED RIGHT GENITOFEMORAL NERVE BLOCK WITH ANESTHESIA  (Right)    Specimen(s):  * No specimens in log *    Estimated Blood Loss:   Minimal    Drains:  * No LDAs found *    Anesthesia Type:   IV Sedation with Anesthesia    Operative Indications:  Neuropathy [G62 9]  Right groin pain [R10 31]      Operative Findings:  same    Complications:   None    Procedure and Technique:    Ultrasound guided right GENITOFEMORAL NERVE BLOCK WITH STEROID     After discussing the risks, benefits, and alternatives to the procedure, the patient expressed understanding and wished to proceed  The patient was brought to the procedure suite and placed in the lateral with the side facing upwards  A procedural pause was conducted to verify: Correct patient identity, procedure to be performed and as applicable, correct side and site, correct patient position, and availability of implants, special equipment or special requirements      At approximately 4 cm from the anterior superior iliac spine along the inguinal crease a 12-4MHz linear ultrasound probe was used to identify iliacus fascia and the femoral nerve  A 2 5 inch 25-gauge needle was advanced down to through each muscle layer stopping superior to the iliacus fascia lateral to the femoral artery  After negative aspiration, 40 mg of Depo-Medrol in 13 mL of 0 25% bupivacaine was injected in the region of the intercostal nerves  The needle was then extracted  Postprocedure, the patient denied any abrupt worsening groin pain and noted paresthesia of the right labia and inner thigh   The patient tolerated the procedure well and there were no apparent complications  The patient was dismissed from the recovery area in stable condition  The patient was advised not to travel in an airplane within the next 24 hours     I was present for the entire procedure    Patient Disposition:  hemodynamically stable    SIGNATURE: Jacoby Cooley MD  DATE: February 4, 2021  TIME: 3:00 PM

## 2021-02-04 NOTE — ANESTHESIA PREPROCEDURE EVALUATION
Procedure:  ULTRASOUND-GUIDED RIGHT GENITOFEMORAL NERVE BLOCK WITH ANESTHESIA  (Right Abdomen)    Relevant Problems   /RENAL   (+) Kidney stones      MUSCULOSKELETAL   (+) Acute bilateral low back pain without sciatica      NEURO/PSYCH   (+) Anxiety   (+) History of Leo's esophagus   (+) History of recurrent UTIs        Kidney stones   History of recurrent UTIs   Anxiety   Screening for cardiovascular condition   Screening for thyroid disorder   Acute bilateral low back pain without sciatica   Fall   History of Leo's esophagus   Change in mole   Encounter for vitamin deficiency screening   Nausea   Umbilical hernia without obstruction and without gangrene   Right groin pain   Post-op pain         smoker    Anesthesia Plan  ASA Score- 2     Anesthesia Type- IV sedation with anesthesia with ASA Monitors  Additional Monitors:   Airway Plan:           Plan Factors-Exercise tolerance (METS): >4 METS  Chart reviewed  EKG reviewed  Imaging results reviewed  Existing labs reviewed  Patient summary reviewed  Induction- intravenous  Postoperative Plan-     Informed Consent- Anesthetic plan and risks discussed with patient  I personally reviewed this patient with the CRNA  Discussed and agreed on the Anesthesia Plan with the CRNA  Scarlett Vega

## 2021-02-04 NOTE — H&P
Assessment:  1  Neuropathy  2  Right groin pain     Plan:  Vira Stahl is a 35 y o  female with complaints of Right groin pain presents to surgical center for procedure  1  We will perform a Procedure(s) (LRB):  2  ULTRASOUND-GUIDED RIGHT GENITOFEMORAL NERVE BLOCK WITH ANESTHESIA  (Right)   3  2  Follow-up 1 month after injection    Complete risks and benefits including bleeding, infection, tissue reaction, nerve injury and allergic reaction were discussed  The approach was demonstrated using models and literature was provided  Verbal and written consent was obtained  My impressions and treatment recommendations were discussed in detail with the patient who verbalized understanding and had no further questions  Discharge instructions were provided  I personally saw and examined the patient and I agree with the above discussed plan of care  Orders Placed This Encounter   Procedures    Imaging- FL < 1 hour     Standing Status:   Standing     Number of Occurrences:   1     Order Specific Question:   Reason for Exam:     Answer:   procedure     Order Specific Question:   Is the patient pregnant?      Answer:   Unknown    Notify physician     Standing Status:   Standing     Number of Occurrences:   1     Order Specific Question:   Temperature greater than     Answer:   100 4     Order Specific Question:   Systolic blood pressure greater than     Answer:   200     Order Specific Question:   Systolic blood pressure less than     Answer:   80     Order Specific Question:   Diastolic blood pressure greater than     Answer:   105     Order Specific Question:   Diastolic blood pressure less than     Answer:   50     Order Specific Question:   Heart rate greater than     Answer:   120     Order Specific Question:   Respiratory rate greater than     Answer:   25     Order Specific Question:   SpO2 less than     Answer:   80     Order Specific Question:   Other     Answer:   Urinary output less than 30 ml/ hr    Insert peripheral IV     Use the largest reasonable catheter size  Standing Status:   Standing     Number of Occurrences:   1    Check fingerstick glucose on all diabetic patients  Call if greater than 180  Standing Status:   Standing     Number of Occurrences:   1     New Medications Ordered This Visit   Medications    lactated ringers infusion       History of Present Illness:  Mook Sifuentes is a 35 y o  female who presents for a follow up office visit in regards to  Right groin  The patients current symptoms include  10/10 constant sharp, stabbing, throbbing, burning, numbness pain without any particular time pattern  I have personally reviewed and/or updated the patient's past medical history, past surgical history, family history, social history, current medications, allergies, and vital signs today  Review of Systems   Genitourinary: Positive for vaginal pain  Neurological: Positive for numbness  All other systems reviewed and are negative        Patient Active Problem List   Diagnosis    Kidney stones    History of recurrent UTIs    Anxiety    Screening for cardiovascular condition    Screening for thyroid disorder    Acute bilateral low back pain without sciatica    Fall    History of Leo's esophagus    Change in mole    Encounter for vitamin deficiency screening    Nausea    Umbilical hernia without obstruction and without gangrene    Right groin pain    Post-op pain       Past Medical History:   Diagnosis Date    GERD (gastroesophageal reflux disease)     Hyperlipidemia     diet controlled    Irritable bowel syndrome     Kidney stone     Periumbilical abdominal pain 8/7/2020       Past Surgical History:   Procedure Laterality Date    APPENDECTOMY      DERMOID CYST  EXCISION Right 10/13/2020    Procedure: EXCISION CYST right eyebrow;  Surgeon: Rai Spivey DO;  Location: [facility];  Service: General    HYSTERECTOMY      [place] REPAIR [place] [address] HERNIA,5+Y/O,REDUCIBL Right 10/13/2020    Procedure: REPAIR HERNIA INGUINAL with mesh;  Surgeon: Dominic Bright DO;  [facility];  Service: General    MD REPAIR UMBILICAL CWKA,6+R/O,GWGFR N/A 10/13/2020    Procedure: REPAIR HERNIA UMBILICAL;  Surgeon: Dominic Bright DO;  [facility];  Service: General       History reviewed  No pertinent family history  Social History     Occupational History    Not on file   Tobacco Use    Smoking status: Heavy Tobacco Smoker     Packs/day: 1 00     Years: 30 00     Pack years: 30 00    Smokeless tobacco: Never Used   Substance and Sexual Activity    Alcohol use: Yes     Comment: social    Drug use: No    Sexual activity: Not on file       No current facility-administered medications on file prior to encounter  Current Outpatient Medications on File Prior to Encounter   Medication Sig    [DISCONTINUED] amitriptyline (ELAVIL) 25 mg tablet Take 1 tablet (25 mg total) by mouth daily at bedtime    [DISCONTINUED] gabapentin (NEURONTIN) 100 mg capsule Take 1 capsule (100 mg total) by mouth 3 (three) times a day (Patient not taking: Reported on 12/21/2020)    [DISCONTINUED] naloxone (NARCAN) 4 mg/0 1 mL nasal spray Administer 1 spray into a nostril  If breathing does not return to normal or if breathing difficulty resumes after 2-3 minutes, give another dose in the other nostril using a new spray   (Patient not taking: Reported on 12/21/2020)    [DISCONTINUED] oxyCODONE (ROXICODONE) 5 mg immediate release tablet Take 1 tablet (5 mg total) by mouth every 4 (four) hours as needed for moderate pain or severe painMax Daily Amount: 30 mg    [DISCONTINUED] oxyCODONE-acetaminophen (PERCOCET) 5-325 mg per tablet Take 1 tablet by mouth every 4 (four) hours as needed for moderate pain or severe painMax Daily Amount: 6 tablets (Patient not taking: Reported on 12/3/2020)       Allergies   Allergen Reactions    Bactrim [Sulfamethoxazole-Trimethoprim] Itching    Levofloxacin Dizziness    Nitrofurantoin      itchiness         Physical Exam:    /56   Pulse 83   Temp (!) 96 9 °F (36 1 °C) (Temporal)   Resp 18   Ht 5' 6" (1 676 m)   Wt 65 3 kg (144 lb)   SpO2 99%   BMI 23 24 kg/m²     Constitutional:normal, well developed, well nourished, alert, in no distress and non-toxic and no overt pain behavior    Eyes:anicteric  HEENT:grossly intact  Neck:supple, symmetric, trachea midline and no masses   Pulmonary:even and unlabored  Cardiovascular:No edema or pitting edema present  Skin:Normal without rashes or lesions and well hydrated  Psychiatric:Mood and affect appropriate  Neurologic:Cranial Nerves II-XII grossly intact  Musculoskeletal:normal

## 2021-02-05 ENCOUNTER — TELEPHONE (OUTPATIENT)
Dept: INTERNAL MEDICINE CLINIC | Facility: CLINIC | Age: 41
End: 2021-02-05

## 2021-02-05 ENCOUNTER — TELEPHONE (OUTPATIENT)
Dept: PAIN MEDICINE | Facility: MEDICAL CENTER | Age: 41
End: 2021-02-05

## 2021-02-05 NOTE — TELEPHONE ENCOUNTER
Pt pcp called stating that pt called complaining about her pain this morning stating she is not able to walk and feels numbness in her legs          PCP would like a nurse to f/u with pt     Pt can be reached at [phone]

## 2021-02-05 NOTE — TELEPHONE ENCOUNTER
I called [facility] as instructed, to let them know that patient had a nerve block yesterday and that Maddie Meyer is requesting for them to reach out to the patient  I spoke to Aissatou at [phone] as did Maddie Meyer and gave the message to her

## 2021-02-05 NOTE — TELEPHONE ENCOUNTER
Patient did reach out regarding her second nerve block  Patient is having weakness and describes her leg feeling like it is "700 pounds"  She states she did attempt to get out of bed last night and did fall due to her leg being weak and numb  Did advise patient to call pain management regarding her symptoms

## 2021-02-05 NOTE — TELEPHONE ENCOUNTER
RN s/w pt  Pt states " My  RT leg feels like it weighs 700 lbs  When I tried to get oob this am my knee gave out and I fell  I didn't hurt myself it was a graceful fall but I am wondering if this will subside?  My procedure was yesterday "      Please advise-

## 2021-02-06 ENCOUNTER — TELEPHONE (OUTPATIENT)
Dept: INTERNAL MEDICINE CLINIC | Facility: CLINIC | Age: 41
End: 2021-02-06

## 2021-02-06 NOTE — TELEPHONE ENCOUNTER
Patient reached out to me personally regarding second opinion for pain management and a hernia specialist  She was inquiring about insurance referrals  I did advise patient that she should check with her insurance to see if she does need referral and that is her option for a second opinion  Patient is continuing to have pain since hernia surgery and recent nerve blocks  Did advise for further issues to contact office regarding her continued care  My office will be in touch with her Monday

## 2021-02-08 ENCOUNTER — TELEPHONE (OUTPATIENT)
Dept: INTERNAL MEDICINE CLINIC | Facility: CLINIC | Age: 41
End: 2021-02-08

## 2021-02-08 DIAGNOSIS — G89.18 POST-OP PAIN: ICD-10-CM

## 2021-02-08 DIAGNOSIS — R10.31 RIGHT GROIN PAIN: ICD-10-CM

## 2021-02-08 DIAGNOSIS — K42.9 UMBILICAL HERNIA WITHOUT OBSTRUCTION AND WITHOUT GANGRENE: Primary | ICD-10-CM

## 2021-02-08 DIAGNOSIS — K42.9 UMBILICAL HERNIA WITHOUT OBSTRUCTION AND WITHOUT GANGRENE: ICD-10-CM

## 2021-02-08 RX ORDER — OXYCODONE HYDROCHLORIDE AND ACETAMINOPHEN 5; 325 MG/1; MG/1
1 TABLET ORAL EVERY 8 HOURS PRN
Qty: 90 TABLET | Refills: 0 | Status: SHIPPED | OUTPATIENT
Start: 2021-02-08 | End: 2021-03-10

## 2021-02-08 RX ORDER — NALOXONE HYDROCHLORIDE 4 MG/.1ML
SPRAY NASAL
Qty: 1 EACH | Refills: 1 | Status: SHIPPED | OUTPATIENT
Start: 2021-02-08

## 2021-02-08 RX ORDER — OXYCODONE HYDROCHLORIDE 5 MG/1
5 TABLET ORAL EVERY 6 HOURS PRN
Qty: 120 TABLET | Refills: 0 | Status: SHIPPED | OUTPATIENT
Start: 2021-02-08 | End: 2021-03-10

## 2021-02-08 NOTE — TELEPHONE ENCOUNTER
Telephone    2/8/2021  [facility] [address]  (Oldest [address])  [address] [facility]         2/8/21 8:30 AM  Note    Did speak with Dr Karthik Schneider this am regarding mutual patient  Did provide number on file and will be in touch with the patient

## 2021-02-08 NOTE — TELEPHONE ENCOUNTER
Long Oliveira from [facility] office called requesting to speak to Dr Fransico Rodriguez regarding patient's status  Please reach out to her at # [phone] opt #1

## 2021-02-08 NOTE — TELEPHONE ENCOUNTER
Did speak with Dr Ricco Medel this am regarding mutual patient  Did provide number on file and will be in touch with the patient

## 2021-02-08 NOTE — ANESTHESIA POSTPROCEDURE EVALUATION
Post-Op Assessment Note    CV Status:  Stable  Pain Score: 1    Pain management: adequate     Mental Status:  Alert and awake   Hydration Status:  Euvolemic   PONV Controlled:  Controlled   Airway Patency:  Patent      Post Op Vitals Reviewed: Yes      Staff: Anesthesiologist         No complications documented      BP      Temp     Pulse     Resp     SpO2

## 2021-02-08 NOTE — TELEPHONE ENCOUNTER
Called to schedule Ty Gómez for an appt  Monday 2/15/21 at 9:15am      Romain did call the patient, and she reviewed the appt time, date, and the location

## 2021-02-09 ENCOUNTER — TELEPHONE (OUTPATIENT)
Dept: SURGERY | Facility: CLINIC | Age: 41
End: 2021-02-09

## 2021-02-09 ENCOUNTER — TELEPHONE (OUTPATIENT)
Dept: OTHER | Facility: HOSPITAL | Age: 41
End: 2021-02-09

## 2021-02-09 ENCOUNTER — TELEPHONE (OUTPATIENT)
Dept: INTERNAL MEDICINE CLINIC | Facility: CLINIC | Age: 41
End: 2021-02-09

## 2021-02-09 NOTE — TELEPHONE ENCOUNTER
Patient did reach out yesterday regarding having second opinion regarding hernia repair  She states she did speak with Dr Adan Lovett in length about her pain and Oxycodone for pain  Did put referral in system for Dr Buckley Speaker and appointment is scheduled on 2/15 at [phone] in [place]

## 2021-02-09 NOTE — TELEPHONE ENCOUNTER
Patient did call stating Dr Nneka Judd is not covered under her insurance  I did advise her that she needs to check her insurance and see who is covered and call there office regarding her insurance  Patient will call and see who is covered and confirm with there office

## 2021-02-09 NOTE — TELEPHONE ENCOUNTER
Patient  Is status post right inguinal hernia and umbilical hernia repair  The right groin hernia was repair with mesh  Unfortunately she had a complicated postoperative course with what appears to be persistent right groin pain likely neuropathic in nature  At various pain medications including narcotics in addition to medications including gabapentin were tried without success  Patient was referred to  Pain management for further evaluation  She underwent nerve blocks of both the ilioinguinal and genitofemoral nerves  Patient was contacted today to see how things were going if she is having any relief  Unfortunately patient states that she has had really no significant relief with the nerve blocks  She states she feels still sharp stabbing and burning pain in the right groin extending into the labia  She states overall this is just been extremely difficult and very hard on her both physically, mentally, and emotionally  At this point   I suspect that there is potential mesh involvement with the nerve  Unfortunately again the nerve blocks have not been successful in establishing any significant relief as per the patient  A given the patient's current situation I will refer her for 2nd opinion  As per the notes her PCP did refer her to Dr Leigh Soriano for 2nd opinion but unfortunately her insurance was not accepted as per the patient  I will reach out to 1 of my partners Dr Estevan Viveros to see if he is available and if his insurance covered so that she can have a 2nd opinion  Patient understands in is agreeable to traveling to see him in consultation  I did talk with the patient briefly about the potential for extraction of the mesh  Patient is a little leery about this as she is afraid things could get worse  I stated at this time we will see with the 2nd opinion asked to say and see if there are any other nonsurgical options given the fact that the nerve blocks have been not successful       Furthermore I will also discuss with the pain management doctor, Dr Vanessa Linda  , to see what his thoughts are regarding this continued pain  The patient also states there have been some issues with insurance coverage of her recent operations in addition to her various imaging that she had done prior to surgery  Unclear why exactly this would not be covered  Patient states that apparently I am listed is being covered by her insurance but the hospital itself is not  I will discuss this with my coordinator and have a review done to see what exactly is covered and hopes of making sure that there are no significant outlying bills that the patient must be responsible for  The patient understands the current situation at hand and is agreeable to the current plan  All questions were answered  She did state that she appreciated the phone call  I assured her that I would do everything I can in hopes of providing her with some relief from her ongoing discomfort

## 2021-02-09 NOTE — TELEPHONE ENCOUNTER
Did call Cherelle Lyles due to her speaking with [place], and having concerns over her insurance and coverage  I did let her know that the imaging that we ordered from our office were authorized through her insurance, and done at [address] due to it being covered there and not [facility]  Did also let her know that the authorization is not a guarantee of payment per the insurance companies  Advised to call the other office that scheduled the out of network imaging to see if there is anything further they can do  Also did let her know she will need to call her insurance company to see what other general surgeon is covered  If the office we put the referral for is covered, or not  If not did advise to ask them for a list of general surgeons that are covered  Did let her know if she gets a list she should research each doctor and see who she would like to go to, let us know, and we will put in the referral for her  She stated she would do that  I also let her know that when putting in a referral it doesn't show on our end if the insurances are covered where we refer out to, that it is between the practice and the patient

## 2021-02-10 ENCOUNTER — TELEPHONE (OUTPATIENT)
Dept: SURGERY | Facility: CLINIC | Age: 41
End: 2021-02-10

## 2021-02-10 NOTE — TELEPHONE ENCOUNTER
Called and left message for patient to call office to make appt with Dr Po Yañez in [facility]

## 2021-02-11 ENCOUNTER — TELEPHONE (OUTPATIENT)
Dept: SURGERY | Facility: CLINIC | Age: 41
End: 2021-02-11

## 2021-02-11 NOTE — TELEPHONE ENCOUNTER
Called to confirm appt with Shari Tabor and she stated she was told we did not accept her insurance and that she wanted to cancel  I looked into her insurance and we do here at the office and it is at [place] or [address] [place] [address]  I tried to call the patient back to see what she would like to do  Rang once and went to   Vm was full/unable to lm

## 2021-02-11 NOTE — TELEPHONE ENCOUNTER
Spoke with the patient and she stated per her insurance company Dr Violetat Sheikh nor the facility are in network and does not want obatin a big medical bill

## 2021-02-24 ENCOUNTER — CONSULT (OUTPATIENT)
Dept: SURGERY | Facility: CLINIC | Age: 41
End: 2021-02-24

## 2021-02-24 ENCOUNTER — TELEPHONE (OUTPATIENT)
Dept: INTERNAL MEDICINE CLINIC | Facility: CLINIC | Age: 41
End: 2021-02-24

## 2021-02-24 VITALS — HEIGHT: 66 IN | BODY MASS INDEX: 23.56 KG/M2 | WEIGHT: 146.6 LBS | TEMPERATURE: 97.4 F

## 2021-02-24 DIAGNOSIS — R10.31 RIGHT GROIN PAIN: Primary | ICD-10-CM

## 2021-02-24 PROBLEM — K42.9 UMBILICAL HERNIA WITHOUT OBSTRUCTION AND WITHOUT GANGRENE: Status: RESOLVED | Noted: 2020-09-19 | Resolved: 2021-02-24

## 2021-02-24 PROCEDURE — 99024 POSTOP FOLLOW-UP VISIT: CPT | Performed by: SURGERY

## 2021-02-24 NOTE — ASSESSMENT & PLAN NOTE
I had a very long discussion with her, over 45 minutes going over her history and with the pain is like  We discussed the operation I told her it sounded looked like it was a routine procedure  I reviewed the CT imaging with her that was done postoperatively which failed to show any thing other than postop changes  We had a long discussion about  Causes of nerve pain and the nerves involved     The pain seems to be in distribution of the nerves  encountered with inguinal hernia surgery  I did tell her the options would be nerve ablation or removal of mesh and the cutting of the nerves or a laparoscopic  preperitoneal triple neurectomies  Told her she was scheduled to see pain management for possible ablation and I told her I thought that that would be the option  That may were relieve all the problems  I did tell her she will most likely be left with numbness but hopefully the pain would go away  I told her if the ablation did work, then exploration of the wound with transecting the nerves and possible mesh removal would be my next step  She is very scared about having the mesh removed  I did tell her if we remove the mesh, we would need to do a tissue repair which would cause some postoperative pain which should get better and hopefully with nerve resection, her pain would get better  The last option I told her would be a triple neurectomy  Told her  I had not done any and would try to find somebody who has done a number of these if we need to go that route   I told her to please call should there be any other issues I can help her with

## 2021-02-24 NOTE — PROGRESS NOTES
Office Visit - General Surgery  Tammy Rizzo MRN: [id number]  Encounter: [id number]    Assessment and Plan    Problem List Items Addressed This Visit        Other    Right groin pain - Primary      I had a very long discussion with her, over 45 minutes going over her history and with the pain is like  We discussed the operation I told her it sounded looked like it was a routine procedure  I reviewed the CT imaging with her that was done postoperatively which failed to show any thing other than postop changes  We had a long discussion about  Causes of nerve pain and the nerves involved     The pain seems to be in distribution of the nerves  encountered with inguinal hernia surgery  I did tell her the options would be nerve ablation or removal of mesh and the cutting of the nerves or a laparoscopic  preperitoneal triple neurectomies  Told her she was scheduled to see pain management for possible ablation and I told her I thought that that would be the option  That may were relieve all the problems  I did tell her she will most likely be left with numbness but hopefully the pain would go away  I told her if the ablation did work, then exploration of the wound with transecting the nerves and possible mesh removal would be my next step  She is very scared about having the mesh removed  I did tell her if we remove the mesh, we would need to do a tissue repair which would cause some postoperative pain which should get better and hopefully with nerve resection, her pain would get better  The last option I told her would be a triple neurectomy  Told her  I had not done any and would try to find somebody who has done a number of these if we need to go that route   I told her to please call should there be any other issues I can help her with               Chief Complaint:  Tammy Rizzo is a 35 y o  female who presents for Pain (Consult 2nd opinion for Dr Rosario Boo )    Subjective   58-year-old female who had an open umbilical hernia and right inguinal hernia repair in October 2020  Postoperatively she had severe amount of pain  She had pain on the right hip region and also in the right groin region down towards the pubis  The pain has not abated and has been severe in nature with more than a 10 on a 0-10 pain scale  Mostly burning stabbing pinching pain  On the right hip region, more bruising that comes and goes  She notices she is unable to lift anything because of the pain  She has difficult time walking because of the pain  She has not slept well because of the pain she has had CT imaging which did not really any did not reveal anything other than postop changes  She was to pain management and had 2 blocks with 1 of the blocks giving her partial temporary relief however she did nothing but laying in bed for the 2 days after that procedure      Past Medical History  Past Medical History:   Diagnosis Date    GERD (gastroesophageal reflux disease)     Hyperlipidemia     diet controlled    Irritable bowel syndrome     Kidney stone     Periumbilical abdominal pain 8/7/2020       Past Surgical History  Past Surgical History:   Procedure Laterality Date    APPENDECTOMY      DERMOID CYST  EXCISION Right 10/13/2020    Procedure: EXCISION CYST right eyebrow;  Surgeon: Pollo Dyer DO;  [facility];  Service: General    HYSTERECTOMY      [place] REPAIR [place] HERNIA,5+Y/O,REDUCIBL Right 10/13/2020    Procedure: REPAIR HERNIA INGUINAL with mesh;  Surgeon: Pollo Dyer DO;  [facility];  Service: General    [place] REPAIR UMBILICAL SRMS,1+I/O,RXHEF N/A 10/13/2020    Procedure: REPAIR HERNIA UMBILICAL;  Surgeon: Pollo Dyer DO;  [facility];  Service: General       Family History  Family History   Problem Relation Age of Onset    No Known Problems Mother     No Known Problems Father        Social History  Social History     Socioeconomic History    Marital status: /Civil Union Spouse name: None    Number of children: None    Years of education: None    Highest education level: None   Occupational History    None   Social Needs    Financial resource strain: None    Food insecurity     Worry: None     Inability: None    Transportation needs     Medical: None     Non-medical: None   Tobacco Use    Smoking status: Heavy Tobacco Smoker     Packs/day: 1 00     Years: 30 00     Pack years: 30 00    Smokeless tobacco: Never Used   Substance and Sexual Activity    Alcohol use: Yes     Comment: social    Drug use: No    Sexual activity: None   Lifestyle    Physical activity     Days per week: None     Minutes per session: None    Stress: None   Relationships    Social connections     Talks on phone: None     Gets together: None     Attends Islam service: None     Active member of club or organization: None     Attends meetings of clubs or organizations: None     Relationship status: None    Intimate partner violence     Fear of current or ex partner: None     Emotionally abused: None     Physically abused: None     Forced sexual activity: None   Other Topics Concern    None   Social History Narrative    None        Medications  Current Outpatient Medications on File Prior to Visit   Medication Sig Dispense Refill    naloxone (NARCAN) 4 mg/0 1 mL nasal spray Administer 1 spray into a nostril  If no response after 2-3 minutes, give another dose in the other nostril using a new spray   1 each 1    oxyCODONE (ROXICODONE) 5 mg immediate release tablet Take 1 tablet (5 mg total) by mouth every 6 (six) hours as needed for moderate pain or severe painMax Daily Amount: 20 mg 120 tablet 0    oxyCODONE-acetaminophen (PERCOCET) 5-325 mg per tablet Take 1 tablet by mouth every 8 (eight) hours as needed for moderate pain or severe painMax Daily Amount: 3 tablets (Patient not taking: Reported on 2/24/2021) 90 tablet 0     No current facility-administered medications on file prior to visit  Allergies  Allergies   Allergen Reactions    Bactrim [Sulfamethoxazole-Trimethoprim] Itching    Levofloxacin Dizziness    Nitrofurantoin      itchiness         Review of Systems    Objective  Vitals:    02/24/21 0924   Temp: (!) 97 4 °F (36 3 °C)       Physical Exam     Abdomen: Infraumbilical incision healing well    Right groin incision healing well and very sensitive to even very light touch

## 2021-03-10 ENCOUNTER — OFFICE VISIT (OUTPATIENT)
Dept: PAIN MEDICINE | Facility: CLINIC | Age: 41
End: 2021-03-10
Payer: COMMERCIAL

## 2021-03-10 VITALS
HEIGHT: 66 IN | TEMPERATURE: 99.7 F | HEART RATE: 90 BPM | SYSTOLIC BLOOD PRESSURE: 115 MMHG | DIASTOLIC BLOOD PRESSURE: 80 MMHG | WEIGHT: 147 LBS | BODY MASS INDEX: 23.63 KG/M2

## 2021-03-10 DIAGNOSIS — F11.20 UNCOMPLICATED OPIOID DEPENDENCE (HCC): ICD-10-CM

## 2021-03-10 DIAGNOSIS — G89.4 CHRONIC PAIN SYNDROME: Primary | ICD-10-CM

## 2021-03-10 DIAGNOSIS — R10.31 RIGHT GROIN PAIN: ICD-10-CM

## 2021-03-10 DIAGNOSIS — Z79.891 LONG-TERM CURRENT USE OF OPIATE ANALGESIC: ICD-10-CM

## 2021-03-10 DIAGNOSIS — G89.18 POST-OP PAIN: ICD-10-CM

## 2021-03-10 PROCEDURE — 99214 OFFICE O/P EST MOD 30 MIN: CPT | Performed by: NURSE PRACTITIONER

## 2021-03-10 RX ORDER — PREGABALIN 50 MG/1
CAPSULE ORAL
Qty: 90 CAPSULE | Refills: 0 | Status: SHIPPED | OUTPATIENT
Start: 2021-03-10

## 2021-03-10 NOTE — PROGRESS NOTES
Assessment:  1  Chronic pain syndrome    2  Right groin pain    3  Post-op pain    4  Long-term current use of opiate analgesic    5  Uncomplicated opioid dependence ([address] )        Plan:   While the patient was in the office today, I did have a thorough conversation regarding their chronic pain syndrome, medication management, and treatment plan options  I discussed with the patient that I feel a medication such as Lyrica  would be helpful in treating her pain  I discussed with patient the type of medication it is, however works, and that it requires a titration process that is specific to each individual   I reviewed with the patient that it may take 3-4 weeks for the medications side effects to be noticed and it should never be abruptly stop  Possible side effects include, but are not limited to:  Vertigo, lethargy, nausea, and edema of the extremities  Advised the patient to call our office if he experience any side effects  The patient verbalized an understanding  Will start Lyrica 50 milligrams titrating to 3 times daily  Patient did have a post op CT scan of her pelvis which was unremarkable for acute findings  I advised her to follow-up with her surgeon, Dr Caitlin Galloway for a referral regarding a 2nd opinion  She states that Dr Caitlin Galloway had referred her to another physician and she had a bad visit with him and is not interested in pursuing any treatment or follow-up with him  The patient will follow-up in 4 weeks for medication prescription refill and reevaluation  The patient was advised to contact the office should their symptoms worsen in the interim  The patient was agreeable and verbalized an understanding  History of Present Illness: The patient is a 35 y o  female   Complaining right groin pain  She recently underwent an ultrasound-guided right genitofemoral nerve block with anesthesia  she denies any improvement     She previously underwent an ultrasound-guided right genitofemoral and ilioinguinal nerve block under anesthesia on 01/19/2021  Unfortunately, she denied any improvement from that injection as well  Current pain level is a 10/10  Quality pain is described as burning, throbbing, shooting, pins and needles  Current pain medications includes: oxycodone 5 mg every 6 hours as needed for pain    The patient reports that this regimen is providing up to 75% pain relief  The patient is reporting no side effects from this pain medication regimen  Pain Contract Signed: 3/10/2021  Last Urine Drug Screen: 3/10/2021    I have personally reviewed and/or updated the patient's past medical history, past surgical history, family history, social history, current medications, allergies, and vital signs today  Review of Systems:    Review of Systems   Constitutional: Negative for fatigue  HENT: Negative for ear pain and hearing loss  Eyes: Negative for redness  Respiratory: Negative for cough  Cardiovascular: Negative for leg swelling  Gastrointestinal: Negative for anal bleeding and rectal pain  Endocrine: Negative for polydipsia  Genitourinary: Negative for hematuria  Musculoskeletal: Positive for gait problem  Negative for joint swelling  Skin: Negative for rash  Neurological: Negative for headaches  Hematological: Does not bruise/bleed easily  Psychiatric/Behavioral: Negative for behavioral problems and hallucinations           Past Medical History:   Diagnosis Date    GERD (gastroesophageal reflux disease)     Hyperlipidemia     diet controlled    Irritable bowel syndrome     Kidney stone     Periumbilical abdominal pain 8/7/2020       Past Surgical History:   Procedure Laterality Date    APPENDECTOMY      DERMOID CYST  EXCISION Right 10/13/2020    Procedure: EXCISION CYST right eyebrow;  Surgeon: Pollo Dyer DO;  Location: [facility];  Service: General    HYSTERECTOMY      DE REPAIR ING HERNIA,5+Y/O,REDUCIBL Right 10/13/2020 Procedure: REPAIR HERNIA INGUINAL with mesh;  Surgeon: Worley Severs, DO;  Location: [facility];  Service: General    [facility] REPAIR UMBILICAL GGVP,1+B/V,QPZSC N/A 10/13/2020    Procedure: REPAIR HERNIA UMBILICAL;  Surgeon: Worley Severs, DO;  Location: [facility];  Service: General       Family History   Problem Relation Age of Onset    No Known Problems Mother     No Known Problems Father        Social History     Occupational History    Not on file   Tobacco Use    Smoking status: Heavy Tobacco Smoker     Packs/day: 1 00     Years: 30 00     Pack years: 30 00    Smokeless tobacco: Never Used   Substance and Sexual Activity    Alcohol use: Yes     Comment: social    Drug use: No    Sexual activity: Not on file         Current Outpatient Medications:     naloxone (NARCAN) 4 mg/0 1 mL nasal spray, Administer 1 spray into a nostril  If no response after 2-3 minutes, give another dose in the other nostril using a new spray , Disp: 1 each, Rfl: 1    pregabalin (LYRICA) 50 mg capsule, 1 PO QHS x 1 day, then 1 PO BID x 1 day, then 1 PO TID, Disp: 90 capsule, Rfl: 0    Allergies   Allergen Reactions    Bactrim [Sulfamethoxazole-Trimethoprim] Itching    Levofloxacin Dizziness    Nitrofurantoin      itchiness         Physical Exam:    /80 (BP Location: Right arm, Patient Position: Standing, Cuff Size: Standard)   Pulse 90   Temp 99 7 °F (37 6 °C)   Ht 5' 6" (1 676 m)   Wt 66 7 kg (147 lb)   BMI 23 73 kg/m²     Constitutional:normal, well developed, well nourished, alert, in no distress and non-toxic and no overt pain behavior    Eyes:anicteric  HEENT:grossly intact  Neck:supple, symmetric, trachea midline and no masses   Pulmonary:even and unlabored  Cardiovascular:No edema or pitting edema present  Skin:Normal without rashes or lesions and well hydrated  Psychiatric:Mood and affect appropriate  Neurologic:Cranial Nerves II-XII grossly intact  Musculoskeletal:normal      Imaging  No orders to display Orders Placed This Encounter   Procedures    MM OF_Alprazolam Definitive    MM OF_Amphetamine Definitive Test    MM OF_Buprenorphine Definitive Test    MM OF_Clonazepam Definitive    MM OF_Cocaine Definitive Test    MM OF_Codeine Definitive    MM OF_Diazepam Definitive    MM OF_Fentanyl Definitive Test    MM OF_Heroin Definitive Test    MM OF_Hydrocodone Definitive    MM OF_Hydromorphone Definitive    MM OF_Lorazepam Definitive    MM OF_Morphine Definitive    MM OF_Oxazepam Definitive    MM OF_Oxycodone Definitive Test - Oral Fluid    MM OF_Oxymorphone Definitive Test    MM OF_Temazepam Definitive    MM OF_THC Definitive Test    MM OF_Tramadol Definitive Test    MM OF_Zolpidem Definitive Test

## 2021-03-10 NOTE — PATIENT INSTRUCTIONS
Pregabalin (By mouth)   Pregabalin ([id number]-[id number]-[id number])  Treats nerve and muscle pain, including fibromyalgia  Also treats partial-onset seizures  Brand Name(s): Lyrica, Lyrica CR   There may be other brand names for this medicine  When This Medicine Should Not Be Used: This medicine is not right for everyone  Do not use it if you had an allergic reaction to pregabalin  How to Use This Medicine:   Capsule, Liquid, Long Acting Tablet  · Take your medicine as directed  Your dose may need to be changed several times to find what works best for you  · Extended-release tablet: Swallow the extended-release tablet whole  Do not crush, break, or chew it  Take it after an evening meal   · Oral liquid: Measure the oral liquid medicine with a marked measuring spoon, oral syringe, or medicine cup  · This medicine should come with a Medication Guide  Ask your pharmacist for a copy if you do not have one  · Missed dose: Take a dose as soon as you remember  If it is almost time for your next dose, wait until then and take a regular dose  Do not take extra medicine to make up for a missed dose  If you miss a dose of the extended-release tablet after your evening meal, take it before bedtime after a snack  If you miss the dose before bedtime, take it after your morning meal  If you do not take the dose the following morning, then take the next dose at your regular time after your evening meal  Do not take 2 doses at the same time  · Store the medicine in a closed container at room temperature, away from heat, moisture, and direct light  Drugs and Foods to Avoid:   Ask your doctor or pharmacist before using any other medicine, including over-the-counter medicines, vitamins, and herbal products  · Some medicines can affect how pregabalin works  Tell your doctor if you are using any of the following:   ? ACE inhibitor (including benazepril, enalapril, lisinopril, quinapril, ramipril)  ?  Oral diabetes medicine (including metformin, pioglitazone, rosiglitazone)  · Do not drink alcohol while you are using this medicine  · Tell your doctor if you use anything else that makes you sleepy  Some examples are allergy medicine, narcotic pain medicine, and alcohol  Tell your doctor if you are also using oxycodone, lorazepam, or zolpidem  Warnings While Using This Medicine:   · Tell your doctor if you are pregnant or breastfeeding, or if you have kidney disease, heart failure, heart rhythm problems, lung or breathing problems, a bleeding disorder, diabetes, sores or skin problems, or a low blood platelet count  Tell your doctor if you have a history of angioedema (severe swelling), alcohol or drug abuse, depression, or other mood problems  · This medicine may cause the following problems:   ? Angioedema (severe swelling), which may be life-threatening  ? Changes in mood or behavior, including suicidal thoughts or behavior  ? Respiratory depression (serious breathing problem that can be life-threatening), when used with narcotic pain medicines  ? Peripheral edema (swelling of your hands, ankles, feet, or lower legs)  ? Increased risk for cancer and bleeding  ? Serious muscle problems  ? Heart rhythm changes  · This medicine may make you dizzy or drowsy  It may also cause blurry or double vision  Do not drive or do anything else that could be dangerous until you know how this medicine affects you  · Do not stop using this medicine suddenly  Your doctor will need to slowly decrease your dose before you stop it completely  · Your doctor will do lab tests at regular visits to check on the effects of this medicine  Keep all appointments  · Keep all medicine out of the reach of children  Never share your medicine with anyone    Possible Side Effects While Using This Medicine:   Call your doctor right away if you notice any of these side effects:  · Allergic reaction: Itching or hives, swelling in your face or hands, swelling or tingling in your mouth or throat, chest tightness, trouble breathing  · Blistering, peeling, red skin rash  · Blue lips, fingernails, or skin, trouble breathing, chest pain  · Blurry or double vision  · Fever, chills, cough, sore throat, body aches  · Muscle pain, tenderness, or weakness, general feeling of illness  · Rapid weight gain, swelling in your hands, ankles, or feet  · Severe dizziness or drowsiness  · Sudden mood changes, unusual moods or behavior, including extreme happiness or depression, thoughts or attempts of killing oneself  · Swelling in your throat, head, or neck  · Uneven heartbeat  · Unusual bleeding, bruising, or weakness  If you notice these less serious side effects, talk with your doctor:   · Confusion, trouble concentrating  · Constipation  · Dry mouth  If you notice other side effects that you think are caused by this medicine, tell your doctor  Call your doctor for medical advice about side effects  You may report side effects to FDA at 8-719-FDA-1169  © Copyright [address] Information is for End User's use only and may not be sold, redistributed or otherwise used for commercial purposes  The above information is an  only  It is not intended as medical advice for individual conditions or treatments  Talk to your doctor, nurse or pharmacist before following any medical regimen to see if it is safe and effective for you

## 2021-03-13 LAB
AMPHET SAL QL CFM: NEGATIVE NG/ML
BUPRENORPHINE SAL QL SCN: NEGATIVE NG/ML
CARBOXYTHC SAL QL CFM: NEGATIVE NG/ML
COCAINE SAL QL CFM: NEGATIVE NG/ML
CODEINE SAL QL CFM: NEGATIVE NG/ML
HYDROCODONE SAL QL CFM: NEGATIVE NG/ML
HYDROCODONE SAL QL CFM: NEGATIVE NG/ML
HYDROMORPHONE SAL QL CFM: NEGATIVE NG/ML
M PROTEIN 3 UR ELPH-MCNC: NORMAL NG/ML
M TB TUBERC IGNF/MITOGEN IGNF CONTROL: NEGATIVE NG/ML
MORPHINE SAL QL CFM: NEGATIVE NG/ML
MORPHINE SAL QL CFM: NEGATIVE NG/ML
OXYMORPHONE SAL QL CFM: NORMAL NG/ML
OXYMORPHONE SAL QL CFM: NORMAL NG/ML
SL AMB 6-MAM (HEROIN METABOLITE) QUANTIFICATION: NEGATIVE NG/ML
SL AMB ALPRAZOLAM QUANTIFICATION: NEGATIVE NG/ML
SL AMB CLONAZEPAM QUANTIFICATION: NEGATIVE NG/ML
SL AMB CZOLPIDEM (ZOLPIDEM METABOLITE) QUANTIFICATION: NEGATIVE
SL AMB DIAZEPAM QUANTIFICATION: NEGATIVE NG/ML
SL AMB FENTANYL QUANTIFICATION: NEGATIVE NG/ML
SL AMB N-DESMETHYL-TRAMADOL QUANTIFICATION SALIVA: NEGATIVE NG/ML
SL AMB NORBUPRENORPHINE QUANTIFICATION: NEGATIVE NG/ML
SL AMB NORDIAZEPAM QUANTIFICATION: NEGATIVE NG/ML
SL AMB NORFENTANYL QUANTIFICATION: NEGATIVE NG/ML
SL AMB NORHYDROCODONE QUANTIFICATION: NEGATIVE NG/ML
SL AMB NORHYDROCODONE QUANTIFICATION: NEGATIVE NG/ML
SL AMB NOROXYCODONE QUANTIFICATION: NORMAL NG/ML
SL AMB O-DESMETHYL-TRAMADOL QUANTIFICATION: NEGATIVE NG/ML
SL AMB OXAZEPAM QUANTIFICATION: NEGATIVE NG/ML
SL AMB TEMAZEPAM QUANTIFICATION: NEGATIVE NG/ML
SL AMB TEMAZEPAM QUANTIFICATION: NEGATIVE NG/ML
SL AMB TRAMADOL QUANTIFICATION: NEGATIVE NG/ML
SL AMB ZOLPIDEM QUANTIFICATION_ORAL: NEGATIVE
SQUAMOUS #/AREA URNS HPF: NEGATIVE NG/ML

## 2021-03-31 ENCOUNTER — OFFICE VISIT (OUTPATIENT)
Dept: PAIN MEDICINE | Facility: CLINIC | Age: 41
End: 2021-03-31
Payer: COMMERCIAL

## 2021-03-31 VITALS
HEART RATE: 72 BPM | WEIGHT: 143 LBS | HEIGHT: 66 IN | SYSTOLIC BLOOD PRESSURE: 117 MMHG | DIASTOLIC BLOOD PRESSURE: 79 MMHG | BODY MASS INDEX: 22.98 KG/M2

## 2021-03-31 DIAGNOSIS — M79.2 NEURALGIA AND NEURITIS: ICD-10-CM

## 2021-03-31 DIAGNOSIS — G89.4 CHRONIC PAIN SYNDROME: Primary | ICD-10-CM

## 2021-03-31 DIAGNOSIS — R10.31 RIGHT GROIN PAIN: ICD-10-CM

## 2021-03-31 PROCEDURE — 99214 OFFICE O/P EST MOD 30 MIN: CPT | Performed by: NURSE PRACTITIONER

## 2021-03-31 PROCEDURE — 3008F BODY MASS INDEX DOCD: CPT | Performed by: NURSE PRACTITIONER

## 2021-03-31 RX ORDER — OXYCODONE HYDROCHLORIDE 5 MG/1
5 TABLET ORAL EVERY 6 HOURS PRN
Qty: 120 TABLET | Refills: 0 | Status: SHIPPED | OUTPATIENT
Start: 2021-03-31 | End: 2021-06-04 | Stop reason: SDUPTHER

## 2021-03-31 RX ORDER — OXYCODONE HYDROCHLORIDE 5 MG/1
5 TABLET ORAL EVERY 6 HOURS PRN
Qty: 120 TABLET | Refills: 0 | Status: SHIPPED | OUTPATIENT
Start: 2021-03-31 | End: 2021-03-31 | Stop reason: SDUPTHER

## 2021-03-31 RX ORDER — OXYCODONE HYDROCHLORIDE AND ACETAMINOPHEN 5; 325 MG/1; MG/1
1 TABLET ORAL EVERY 4 HOURS PRN
COMMUNITY
End: 2021-03-31

## 2021-03-31 NOTE — PATIENT INSTRUCTIONS
Oxycodone refill dates:  3/31/2021, 4/28/2021    Opioid Safety   WHAT YOU NEED TO KNOW:   An opioid medicine is used to treat pain  Examples are oxycodone, morphine, fentanyl, or codeine  Pain control and management may help you rest, heal, and return to your daily activities  You and your family will receive information about how to manage your pain at home  The instructions will include what to do if you have side effects as your pain is managed  You will get information on how to handle opioid medicine safely  You will also get suggestions on how to control pain without opioids  It is important to follow all instructions so your pain is managed effectively  DISCHARGE INSTRUCTIONS:   Call your local emergency number (911 in the [place]), or have someone else call if:   · You have a seizure  · You cannot be woken  · You have trouble staying awake and your breathing is slow or shallow  · Your speech is slurred, or you are confused  · You are dizzy or stumble when you walk  Call your doctor, or have someone close to you call if:   · You are extremely drowsy, or you have trouble staying awake or speaking  · You have pale or clammy skin  · You have blue fingernails or lips  · Your heartbeat is slower than normal     · You cannot stop vomiting  · You have questions or concerns about your condition or care  Use opioids safely:   · Take prescribed opioids exactly as directed  Opioids come with directions based on the kind and how it is given  Talk to your healthcare provider or a pharmacist if you have any questions  Do not take more than the recommended amount  Too much can cause a life-threatening overdose  Do not continue to take it after your pain stops  You may develop tolerance  This means you keep needing higher doses to get the same effect  You may also develop opioid use disorder  This means you are not able to control your opioid use      · Do not give opioids to others or take opioids that belong to someone else  The kind or amount one person takes may not be right for another  The person you share them with may also be taking medicines that do not mix with opioids  He or she may drink alcohol or use other drugs that can cause life-threatening problems when mixed with opioids  · Do not mix opioids with other medicines or alcohol  The combination can cause an overdose, or cause you to stop breathing  Alcohol, sleeping pills, and medicines such as antihistamines can make you sleepy  A combination with opioids can lead to a coma  · Do not drive or operate heavy machinery after you use an opioid  You may feel drowsy or have trouble concentrating  You can injure yourself or others if you drive or use heavy machinery when you are not alert  Your provider or pharmacist can tell you how long to wait after a dose before you do these activities  · Talk to your healthcare provider if you have any side effects  Side effects include nausea, sleepiness, itching, and trouble thinking clearly  Your provider may need to make changes to the kind or amount of opioid you are taking  He or she can also help you find ways to prevent or relieve side effects  Manage constipation:  Constipation is the most common side effect of opioid medicine  Constipation is when you have hard, dry bowel movements, or you go longer than usual between bowel movements  Tell your healthcare provider about all changes in your bowel movements while you are taking opioids  He or she may recommend laxative medicine to help you have a bowel movement  He or she may also change the kind of opioid you are taking, or change when you take it  The following are more ways you can prevent or relieve constipation:  · Drink liquids as directed  You may need to drink extra liquids to help soften and move your bowels  Ask how much liquid to drink each day and which liquids are best for you  · Eat high-fiber foods    This may help decrease constipation by adding bulk to your bowel movements  High-fiber foods include fruits, vegetables, whole-grain breads and cereals, and beans  Your healthcare provider or dietitian can help you create a high-fiber meal plan  Your provider may also recommend a fiber supplement if you cannot get enough fiber from food  · Exercise regularly  Regular physical activity can help stimulate your intestines  Walking is a good exercise to prevent or relieve constipation  Ask which exercises are best for you  · Schedule a time each day to have a bowel movement  This may help train your body to have regular bowel movements  Bend forward while you are on the toilet to help move the bowel movement out  Sit on the toilet for at least 10 minutes, even if you do not have a bowel movement  Store opioids safely:   · Store opioids where others cannot easily get them  Keep them in a locked cabinet or secure area  Do not  keep them in a purse or other bag you carry with you  A person may be looking for something else and find the opioids  · Make sure opioids are stored out of the reach of children  A child can easily overdose on opioids  Opioids may look like candy to a small child  The best way to dispose of opioids: The laws vary by country and area  In the [place], the best way is to return the opioids through a take-back program  This program is offered by the [employer])  The following are options for using the program:  · Take the opioids to a ADITI collection site  The site is often a law enforcement center  Call your local law enforcement center for scheduled take-back days in your area  You will be given information on where to go if the collection site is in a different location  · Take the opioids to an approved pharmacy or hospital   A pharmacy or hospital may be set up as a collection site   You will need to ask if it is a ADITI collection site if you were not directed there  A pharmacy or doctor's office may not be able to take back opioids unless it is a ADITI site  · Use a mail-back system  This means you are given containers to put the opioids into  You will then mail them in the containers  · Use a take-back drop box  This is a place to leave the opioids at any time  People and animals will not be able to get into the box  Your local law enforcement agency can tell you where to find a drop box in your area  Other safe ways to dispose of opioids: The medicine may come with disposal instructions  The instructions may vary depending on the brand of medicine you are using  Instructions may come in a Medication Guide, but not every medicine has one  You may instead get instructions from your pharmacy or doctor  Follow instructions carefully  The following are general guidelines to follow:  · Find out if you can flush the opioid  Some opioids can be flushed down the toilet or poured into the sink  You will need to contact authorities in your area to see if this is an option for you  The FDA also offers a list of medicines that are safe to flush down the toilet  You can check the list if you cannot get the information for your local area  · Ask your waste management company about rules for putting opioids in the trash  The company will be able to give you specific directions  Scratch out personal information on the original medicine label so it cannot be read  Then put it in the trash  Do not label the trash or put any information on it about the opioids  It should look like regular household trash so no one is tempted to look for the opioids  Keep the trash out of the reach of children and animals  Always make sure trash is secure  · Talk to officials if you live in a facility  If you live in a nursing home or assisted living center, talk to an official  The person will know the rules for your area      Other ways to manage pain:   · Ask your healthcare provider about non-opioid medicines to control pain  Some medicines may even work better than opioids, depending on the cause of your pain  Nonprescription medicines include NSAIDs (such as ibuprofen) and acetaminophen  Prescription medicines include muscle relaxers, antidepressants, and steroids  · Pain may be managed without any medicines  Some ways to relieve pain include massage, aromatherapy, or meditation  Physical or occupational therapy may also help  For more information:   · Drug Enforcement Administration  [address] [facility] [address]  Phone: 0- 018 - 866-5122  Web Address: [facility]/drug_disposal/    · [address]  and Drug Administration  [place] , [address] [facility]  Phone: 6- 234 - 386-0131  Web Address: http://[employer]/  Follow up with your doctor or pain specialist as directed: You may need to have your dose adjusted  Your doctor or pain specialist can also help you find ways to manage pain without opioids  Write down your questions so you remember to ask them during your visits  © Copyright [address] [place] [address] Information is for End User's use only and may not be sold, redistributed or otherwise used for commercial purposes  All illustrations and images included in CareNotes® are the copyrighted property of [employer] D [employer] M [employer]  or Abraham Chadwick  The above information is an  only  It is not intended as medical advice for individual conditions or treatments  Talk to your doctor, nurse or pharmacist before following any medical regimen to see if it is safe and effective for you

## 2021-03-31 NOTE — PROGRESS NOTES
Assessment:  1  Chronic pain syndrome    2  Right groin pain    3  Neuralgia and neuritis        Plan:   While the patient was in the office today, I did have a thorough conversation regarding their chronic pain syndrome, medication management, and treatment plan options  Patient is a 78-year-old female with chronic pain syndrome related to right groin pain, neuralgia neuritis status post inguinal hernia repair  She was last seen here on 03/10/2021 at which time she was started on Lyrica  Unfortunately, her insurance requires prior authorization for this medication which was not completed up to this point  During today's visit, we initiated the prior authorization process for Lyrica  Patient has previously tried gabapentin and amitriptyline  Both medications were discontinued due to side effects  She has been referred for a 2nd surgical opinion regarding the right inguinal hernia repair  She is scheduled for an evaluation at the end of April  Continue oxycodone 5 mg every 6 hours as needed for pain  The patient's opioid scripts were sent to their pharmacy electronically and was given a 2 month supply of prescriptions with a Do Not Fill date(s) of   03/31/2021, 04/28/2021  The patient will follow-up in 2 months for medication prescription refill and reevaluation  The patient was advised to contact the office should their symptoms worsen in the interim  The patient was agreeable and verbalized an understanding  [place] Prescription Drug Monitoring Program report was reviewed and was appropriate         There are risks associated with opioid medications, including dependence, addiction and tolerance  The patient understands and agrees to use these medications only as prescribed  Potential side effects of the medications include, but are not limited to, constipation, drowsiness, addiction, impaired judgment and risk of fatal overdose if not taken as prescribed   The patient was warned against driving while taking sedation medications  Sharing medications is a felony  At this point in time, the patient is showing no signs of addiction, abuse, diversion or suicidal ideation  The patient will follow-up in 8 weeks for medication prescription refill and reevaluation  The patient was advised to contact the office should their symptoms worsen in the interim  The patient was agreeable and verbalized an understanding  History of Present Illness: The patient is a 35 y o  female last seen on  03/10/2021 who presents for a follow up office visit in regards to chronic pain secondary to  Chronic pain syndrome related to right groin pain, neuralgia neuritis status post right inguinal hernia repair  The patient currently reports  Continued right groin pain  Current pain level is a 7-9/10  Quality pain is described as burning, sharp, throbbing, pinching, stabbing    Current pain medications includes: oxycodone 5/325 every 6 hours as needed for pain    The patient reports that this regimen is providing up to 75% pain relief  The patient is reporting no side effects from this pain medication regimen  Pain Contract Signed: 3/10/2021  Last Urine Drug Screen: 3/10/2021    I have personally reviewed and/or updated the patient's past medical history, past surgical history, family history, social history, current medications, allergies, and vital signs today  Review of Systems:    Review of Systems   Constitutional: Negative for fatigue  HENT: Negative for ear pain and hearing loss  Eyes: Negative for redness  Respiratory: Negative for cough  Cardiovascular: Negative for leg swelling  Gastrointestinal: Negative for anal bleeding and rectal pain  Endocrine: Negative for polydipsia  Genitourinary: Negative for hematuria  Musculoskeletal: Positive for gait problem and myalgias (left hip and abdominal pain)  Negative for joint swelling  Skin: Negative for rash     Neurological: Negative for headaches  Hematological: Does not bruise/bleed easily  Psychiatric/Behavioral: Negative for behavioral problems and hallucinations  Past Medical History:   Diagnosis Date    GERD (gastroesophageal reflux disease)     Hyperlipidemia     diet controlled    Irritable bowel syndrome     Kidney stone     Periumbilical abdominal pain 8/7/2020       Past Surgical History:   Procedure Laterality Date    APPENDECTOMY      DERMOID CYST  EXCISION Right 10/13/2020    Procedure: EXCISION CYST right eyebrow;  Surgeon: Ritchie Bey DO;  [facility];  Service: General    HYSTERECTOMY      RI REPAIR ING HERNIA,5+Y/O,REDUCIBL Right 10/13/2020    Procedure: REPAIR HERNIA INGUINAL with mesh;  Surgeon: Ritchie Bey DO;  [facility];  Service: General    RI REPAIR UMBILICAL NEBU,2+H/L,XAQQS N/A 10/13/2020    Procedure: REPAIR HERNIA UMBILICAL;  Surgeon: Ritchie Bey DO;  [facility];  Service: General       Family History   Problem Relation Age of Onset    No Known Problems Mother     No Known Problems Father        Social History     Occupational History    Not on file   Tobacco Use    Smoking status: Heavy Tobacco Smoker     Packs/day: 1 00     Years: 30 00     Pack years: 30 00    Smokeless tobacco: Never Used   Substance and Sexual Activity    Alcohol use: Yes     Comment: social    Drug use: No    Sexual activity: Not on file         Current Outpatient Medications:     naloxone (NARCAN) 4 mg/0 1 mL nasal spray, Administer 1 spray into a nostril   If no response after 2-3 minutes, give another dose in the other nostril using a new spray , Disp: 1 each, Rfl: 1    pregabalin (LYRICA) 50 mg capsule, 1 PO QHS x 1 day, then 1 PO BID x 1 day, then 1 PO TID, Disp: 90 capsule, Rfl: 0    oxyCODONE (ROXICODONE) 5 mg immediate release tablet, Take 1 tablet (5 mg total) by mouth every 6 (six) hours as needed for moderate pain Do not fill until 4/28/2021Max Daily Amount: 20 mg, Disp: 120 tablet, Rfl: 0    Allergies   Allergen Reactions    Bactrim [Sulfamethoxazole-Trimethoprim] Itching    Levofloxacin Dizziness    Nitrofurantoin      itchiness         Physical Exam:    /79 (BP Location: Left arm, Patient Position: Standing, Cuff Size: Standard)   Pulse 72   Ht 5' 6" (1 676 m)   Wt 64 9 kg (143 lb)   BMI 23 08 kg/m²     Constitutional:normal, well developed, well nourished, alert, in no distress and non-toxic and no overt pain behavior  Eyes:anicteric  HEENT:grossly intact  Neck:supple, symmetric, trachea midline and no masses   Pulmonary:even and unlabored  Cardiovascular:No edema or pitting edema present  Skin:Normal without rashes or lesions and well hydrated  Psychiatric:Mood and affect appropriate  Neurologic:Cranial Nerves II-XII grossly intact  Musculoskeletal:antalgic      Imaging  No orders to display         No orders of the defined types were placed in this encounter

## 2021-04-05 ENCOUNTER — TELEPHONE (OUTPATIENT)
Dept: PAIN MEDICINE | Facility: MEDICAL CENTER | Age: 41
End: 2021-04-05

## 2021-06-04 ENCOUNTER — OFFICE VISIT (OUTPATIENT)
Dept: PAIN MEDICINE | Facility: CLINIC | Age: 41
End: 2021-06-04
Payer: COMMERCIAL

## 2021-06-04 VITALS
SYSTOLIC BLOOD PRESSURE: 133 MMHG | HEIGHT: 66 IN | HEART RATE: 87 BPM | DIASTOLIC BLOOD PRESSURE: 75 MMHG | BODY MASS INDEX: 23.08 KG/M2

## 2021-06-04 DIAGNOSIS — R10.31 RIGHT GROIN PAIN: ICD-10-CM

## 2021-06-04 DIAGNOSIS — Z79.891 LONG-TERM CURRENT USE OF OPIATE ANALGESIC: ICD-10-CM

## 2021-06-04 DIAGNOSIS — M79.2 NEURALGIA AND NEURITIS: ICD-10-CM

## 2021-06-04 DIAGNOSIS — Z79.891 ENCOUNTER FOR LONG-TERM OPIATE ANALGESIC USE: ICD-10-CM

## 2021-06-04 DIAGNOSIS — F11.20 UNCOMPLICATED OPIOID DEPENDENCE (HCC): ICD-10-CM

## 2021-06-04 DIAGNOSIS — G89.4 CHRONIC PAIN SYNDROME: Primary | ICD-10-CM

## 2021-06-04 PROCEDURE — 80305 DRUG TEST PRSMV DIR OPT OBS: CPT | Performed by: NURSE PRACTITIONER

## 2021-06-04 PROCEDURE — 99214 OFFICE O/P EST MOD 30 MIN: CPT | Performed by: NURSE PRACTITIONER

## 2021-06-04 RX ORDER — OXYCODONE HYDROCHLORIDE 5 MG/1
5 TABLET ORAL EVERY 6 HOURS PRN
Qty: 120 TABLET | Refills: 0 | Status: SHIPPED | OUTPATIENT
Start: 2021-06-04

## 2021-06-04 NOTE — PATIENT INSTRUCTIONS
Opioid Safety   WHAT YOU NEED TO KNOW:   An opioid medicine is used to treat pain  Examples are oxycodone, morphine, fentanyl, or codeine  Pain control and management may help you rest, heal, and return to your daily activities  You and your family will receive information about how to manage your pain at home  The instructions will include what to do if you have side effects as your pain is managed  You will get information on how to handle opioid medicine safely  You will also get suggestions on how to control pain without opioids  It is important to follow all instructions so your pain is managed effectively  DISCHARGE INSTRUCTIONS:   Call your local emergency number (911 in the [place]), or have someone else call if:   · You have a seizure  · You cannot be woken  · You have trouble staying awake and your breathing is slow or shallow  · Your speech is slurred, or you are confused  · You are dizzy or stumble when you walk  Call your doctor, or have someone close to you call if:   · You are extremely drowsy, or you have trouble staying awake or speaking  · You have pale or clammy skin  · You have blue fingernails or lips  · Your heartbeat is slower than normal     · You cannot stop vomiting  · You have questions or concerns about your condition or care  Use opioids safely:   · Take prescribed opioids exactly as directed  Opioids come with directions based on the kind and how it is given  Talk to your healthcare provider or a pharmacist if you have any questions  Do not take more than the recommended amount  Too much can cause a life-threatening overdose  Do not continue to take it after your pain stops  You may develop tolerance  This means you keep needing higher doses to get the same effect  You may also develop opioid use disorder  This means you are not able to control your opioid use  · Do not give opioids to others or take opioids that belong to someone else    The kind or amount one person takes may not be right for another  The person you share them with may also be taking medicines that do not mix with opioids  He or she may drink alcohol or use other drugs that can cause life-threatening problems when mixed with opioids  · Do not mix opioids with other medicines or alcohol  The combination can cause an overdose, or cause you to stop breathing  Alcohol, sleeping pills, and medicines such as antihistamines can make you sleepy  A combination with opioids can lead to a coma  · Do not drive or operate heavy machinery after you use an opioid  You may feel drowsy or have trouble concentrating  You can injure yourself or others if you drive or use heavy machinery when you are not alert  Your provider or pharmacist can tell you how long to wait after a dose before you do these activities  · Talk to your healthcare provider if you have any side effects  Side effects include nausea, sleepiness, itching, and trouble thinking clearly  Your provider may need to make changes to the kind or amount of opioid you are taking  He or she can also help you find ways to prevent or relieve side effects  Manage constipation:  Constipation is the most common side effect of opioid medicine  Constipation is when you have hard, dry bowel movements, or you go longer than usual between bowel movements  Tell your healthcare provider about all changes in your bowel movements while you are taking opioids  He or she may recommend laxative medicine to help you have a bowel movement  He or she may also change the kind of opioid you are taking, or change when you take it  The following are more ways you can prevent or relieve constipation:  · Drink liquids as directed  You may need to drink extra liquids to help soften and move your bowels  Ask how much liquid to drink each day and which liquids are best for you  · Eat high-fiber foods    This may help decrease constipation by adding bulk to your bowel movements  High-fiber foods include fruits, vegetables, whole-grain breads and cereals, and beans  Your healthcare provider or dietitian can help you create a high-fiber meal plan  Your provider may also recommend a fiber supplement if you cannot get enough fiber from food  · Exercise regularly  Regular physical activity can help stimulate your intestines  Walking is a good exercise to prevent or relieve constipation  Ask which exercises are best for you  · Schedule a time each day to have a bowel movement  This may help train your body to have regular bowel movements  Bend forward while you are on the toilet to help move the bowel movement out  Sit on the toilet for at least 10 minutes, even if you do not have a bowel movement  Store opioids safely:   · Store opioids where others cannot easily get them  Keep them in a locked cabinet or secure area  Do not  keep them in a purse or other bag you carry with you  A person may be looking for something else and find the opioids  · Make sure opioids are stored out of the reach of children  A child can easily overdose on opioids  Opioids may look like candy to a small child  The best way to dispose of opioids: The laws vary by country and area  In the [place], the best way is to return the opioids through a take-back program  This program is offered by the [employer]  The following are options for using the program:  · Take the opioids to a ADITI collection site  The site is often a law enforcement center  Call your local law enforcement center for scheduled take-back days in your area  You will be given information on where to go if the collection site is in a different location  · Take the opioids to an approved pharmacy or hospital   A pharmacy or hospital may be set up as a collection site  You will need to ask if it is a ADITI collection site if you were not directed there   A pharmacy or doctor's office may not be able to take back opioids unless it is a ADITI site  · Use a mail-back system  This means you are given containers to put the opioids into  You will then mail them in the containers  · Use a take-back drop box  This is a place to leave the opioids at any time  People and animals will not be able to get into the box  Your local law enforcement agency can tell you where to find a drop box in your area  Other safe ways to dispose of opioids: The medicine may come with disposal instructions  The instructions may vary depending on the brand of medicine you are using  Instructions may come in a Medication Guide, but not every medicine has one  You may instead get instructions from your pharmacy or doctor  Follow instructions carefully  The following are general guidelines to follow:  · Find out if you can flush the opioid  Some opioids can be flushed down the toilet or poured into the sink  You will need to contact authorities in your area to see if this is an option for you  The FDA also offers a list of medicines that are safe to flush down the toilet  You can check the list if you cannot get the information for your local area  · Ask your waste management company about rules for putting opioids in the trash  The company will be able to give you specific directions  Scratch out personal information on the original medicine label so it cannot be read  Then put it in the trash  Do not label the trash or put any information on it about the opioids  It should look like regular household trash so no one is tempted to look for the opioids  Keep the trash out of the reach of children and animals  Always make sure trash is secure  · Talk to officials if you live in a facility  If you live in a nursing home or assisted living center, talk to an official  The person will know the rules for your area  Other ways to manage pain:   · Ask your healthcare provider about non-opioid medicines to control pain  Some medicines may even work better than opioids, depending on the cause of your pain  Nonprescription medicines include NSAIDs (such as ibuprofen) and acetaminophen  Prescription medicines include muscle relaxers, antidepressants, and steroids  · Pain may be managed without any medicines  Some ways to relieve pain include massage, aromatherapy, or meditation  Physical or occupational therapy may also help  For more information:   · Drug Enforcement Administration  [address] [facility] [address]  Phone: 6- 928 - 157-9835  Web Address: [email or web address]_[email or web address]    · [address] and Drug Administration  [place] , [address] [facility]  Phone: 2- 033 - 552-5476  Web Address: http://[employer]/  Follow up with your doctor or pain specialist as directed: You may need to have your dose adjusted  Your doctor or pain specialist can also help you find ways to manage pain without opioids  Write down your questions so you remember to ask them during your visits  © Copyright [address] [place] [address] Information is for End User's use only and may not be sold, redistributed or otherwise used for commercial purposes  All illustrations and images included in CareNotes® are the copyrighted property of [employer]  or Abraham Chadwick  The above information is an  only  It is not intended as medical advice for individual conditions or treatments  Talk to your doctor, nurse or pharmacist before following any medical regimen to see if it is safe and effective for you

## 2021-06-04 NOTE — PROGRESS NOTES
Assessment:  1  Chronic pain syndrome    2  Right groin pain    3  Neuralgia and neuritis    4  Encounter for long-term opiate analgesic use    5  Uncomplicated opioid dependence ([address] )        Plan:   While the patient was in the office today, I did have a thorough conversation regarding their chronic pain syndrome, medication management, and treatment plan options  Patient is a 53-year-old female with chronic pain syndrome related to chronic right groin pain, neuralgia neuritis  Patient had a 2nd surgical opinion and will be having exploratory surgery next week for nerve entrapment  Continue oxycodone 5 mg every 6 hours as needed for pain  Prescription was sent electronically to her pharmacy  There are risks associated with opioid medications, including dependence, addiction and tolerance  The patient understands and agrees to use these medications only as prescribed  Potential side effects of the medications include, but are not limited to, constipation, drowsiness, addiction, impaired judgment and risk of fatal overdose if not taken as prescribed  The patient was warned against driving while taking sedation medications  Sharing medications is a felony  At this point in time, the patient is showing no signs of addiction, abuse, diversion or suicidal ideation  A urine drug screen was collected at today's office visit as part of our medication management protocol  The point of care testing results were appropriate for what was being prescribed  The specimen will be sent for confirmatory testing  The drug screen is medically necessary because the patient is either dependent on opioid medication or is being considered for opioid medication therapy and the results could impact ongoing or future treatment  The drug screen is to evaluate for the presences or absence of prescribed, non-prescribed, and/or illicit drugs/substances      [place] Prescription Drug Monitoring Program report was reviewed and was appropriate       History of Present Illness: The patient is a 35 y o  female who presents for a follow up office visit in regards to Back Pain  The patients current symptoms include   Complaints of right groin pain  Current pain level is a 10/10  Pain is described as burning, dull, aching, pinching  Current pain medications includes:  Oxycodone 5 mg every 6 hours as needed for pain    The patient reports that this regimen is providing 25% pain relief  The patient is reporting no side effects from this pain medication regimen  I have personally reviewed and/or updated the patient's past medical history, past surgical history, family history, social history, current medications, allergies, and vital signs today  Review of Systems  Review of Systems   Constitutional: Negative for fatigue and unexpected weight change  HENT: Negative for dental problem, ear pain, hearing loss and sneezing  Eyes: Negative for visual disturbance  Respiratory: Negative for cough and chest tightness  Cardiovascular: Negative for leg swelling  Gastrointestinal: Negative for anal bleeding  Endocrine: Negative for heat intolerance  Genitourinary: Negative for flank pain and genital sores  Musculoskeletal: Positive for gait problem  Decreased range of motion   Skin: Negative for wound  Allergic/Immunologic: Negative for immunocompromised state  Neurological: Negative for speech difficulty and light-headedness  Hematological: Negative for adenopathy  Psychiatric/Behavioral: Negative for confusion  The patient is not hyperactive  All other systems reviewed and are negative          Past Medical History:   Diagnosis Date    GERD (gastroesophageal reflux disease)     Hyperlipidemia     diet controlled    Irritable bowel syndrome     Kidney stone     Periumbilical abdominal pain 8/7/2020       Past Surgical History:   Procedure Laterality Date    APPENDECTOMY      DERMOID CYST  EXCISION Right 10/13/2020    Procedure: EXCISION CYST right eyebrow;  Surgeon: Nila Shirley DO;  [facility];  Service: General    HYSTERECTOMY      [place] REPAIR [address] HERNIA,5+Y/O,REDUCIBL Right 10/13/2020    Procedure: REPAIR HERNIA INGUINAL with mesh;  Surgeon: Nila Shirley DO;  [facility];  Service: General    IL REPAIR UMBILICAL BRWK,3+N/B,HCYXM N/A 10/13/2020    Procedure: REPAIR HERNIA UMBILICAL;  Surgeon: Nila Shirley DO;  [facility];  Service: General       Family History   Problem Relation Age of Onset    No Known Problems Mother     No Known Problems Father        Social History     Occupational History    Not on file   Tobacco Use    Smoking status: Heavy Tobacco Smoker     Packs/day: 1 00     Years: 30 00     Pack years: 30 00    Smokeless tobacco: Never Used   Substance and Sexual Activity    Alcohol use: Yes     Comment: social    Drug use: No    Sexual activity: Not on file         Current Outpatient Medications:     naloxone (NARCAN) 4 mg/0 1 mL nasal spray, Administer 1 spray into a nostril  If no response after 2-3 minutes, give another dose in the other nostril using a new spray , Disp: 1 each, Rfl: 1    oxyCODONE (ROXICODONE) 5 mg immediate release tablet, Take 1 tablet (5 mg total) by mouth every 6 (six) hours as needed for moderate pain Do not fill until 4/28/2021Max Daily Amount: 20 mg, Disp: 120 tablet, Rfl: 0    pregabalin (LYRICA) 50 mg capsule, 1 PO QHS x 1 day, then 1 PO BID x 1 day, then 1 PO TID, Disp: 90 capsule, Rfl: 0    Allergies   Allergen Reactions    Bactrim [Sulfamethoxazole-Trimethoprim] Itching    Levofloxacin Dizziness    Nitrofurantoin      itchiness         Physical Exam:    /75   Pulse 87   Ht 5' 6" (1 676 m)   BMI 23 08 kg/m²     Constitutional: patient is in moderate distress due to pain    Eyes:anicteric  HEENT:grossly intact  Neck:supple, symmetric, trachea midline and no masses   Pulmonary:even and unlabored  Cardiovascular:No edema or pitting edema present  Skin:Normal without rashes or lesions and well hydrated  Psychiatric:Mood and affect appropriate  Neurologic:Cranial Nerves II-XII grossly intact  Musculoskeletal:  Tenderness to even very light palpation over the right inguinal area    Imaging  No orders to display       No orders of the defined types were placed in this encounter

## 2021-06-10 LAB
7AMINOCLONAZEPAM SAL QL CFM: NEGATIVE
AMPHET SAL QL CFM: NEGATIVE NG/ML
BUPRENORPHINE SAL QL SCN: NEGATIVE NG/ML
CARBOXYTHC SAL QL CFM: NEGATIVE NG/ML
COCAINE SAL QL CFM: NEGATIVE NG/ML
CODEINE SAL QL CFM: NEGATIVE NG/ML
EDDP SAL QL CFM: NEGATIVE NG/ML
HYDROCODONE SAL QL CFM: NEGATIVE NG/ML
HYDROCODONE SAL QL CFM: NEGATIVE NG/ML
HYDROMORPHONE SAL QL CFM: NEGATIVE NG/ML
LEUKEMIA MARKERS BLD-IMP: NEGATIVE NG/ML
M PROTEIN 3 UR ELPH-MCNC: NORMAL NG/ML
M TB TUBERC IGNF/MITOGEN IGNF CONTROL: NEGATIVE NG/ML
METHADONE SAL QL CFM: NEGATIVE NG/ML
MORPHINE SAL QL CFM: NEGATIVE NG/ML
MORPHINE SAL QL CFM: NEGATIVE NG/ML
OXYMORPHONE SAL QL CFM: NEGATIVE NG/ML
OXYMORPHONE SAL QL CFM: NEGATIVE NG/ML
SL AMB 6-MAM (HEROIN METABOLITE) QUANTIFICATION: NEGATIVE NG/ML
SL AMB ALPRAZOLAM QUANTIFICATION: NEGATIVE NG/ML
SL AMB CARISOPRODOL QUANTIFICATION: NEGATIVE NG/ML
SL AMB CLONAZEPAM QUANTIFICATION: NEGATIVE NG/ML
SL AMB DIAZEPAM QUANTIFICATION: NEGATIVE NG/ML
SL AMB FENTANYL QUANTIFICATION: NEGATIVE NG/ML
SL AMB MDMA QUANTIFICATION: NEGATIVE NG/ML
SL AMB MEPROBAMATE QUANTIFICATION: NEGATIVE NG/ML
SL AMB N-DESMETHYL-TRAMADOL QUANTIFICATION SALIVA: NEGATIVE NG/ML
SL AMB NORBUPRENORPHINE QUANTIFICATION: NEGATIVE NG/ML
SL AMB NORDIAZEPAM QUANTIFICATION: NEGATIVE NG/ML
SL AMB NORFENTANYL QUANTIFICATION: NEGATIVE NG/ML
SL AMB NORHYDROCODONE QUANTIFICATION: NEGATIVE NG/ML
SL AMB NORHYDROCODONE QUANTIFICATION: NEGATIVE NG/ML
SL AMB NORMEPERIDINE QUANTIFICATION: NEGATIVE NG/ML
SL AMB NOROXYCODONE QUANTIFICATION: NORMAL NG/ML
SL AMB OXAZEPAM QUANTIFICATION: NEGATIVE NG/ML
SL AMB TEMAZEPAM QUANTIFICATION: NEGATIVE NG/ML
SL AMB TEMAZEPAM QUANTIFICATION: NEGATIVE NG/ML
SL AMB TRAMADOL QUANTIFICATION: NEGATIVE NG/ML
SQUAMOUS #/AREA URNS HPF: NEGATIVE NG/ML

## 2021-07-01 ENCOUNTER — APPOINTMENT (OUTPATIENT)
Dept: LAB | Facility: CLINIC | Age: 41
End: 2021-07-01
Payer: COMMERCIAL

## 2021-07-01 DIAGNOSIS — R39.9 UTI SYMPTOMS: ICD-10-CM

## 2021-07-01 DIAGNOSIS — R39.9 UTI SYMPTOMS: Primary | ICD-10-CM

## 2021-07-01 LAB
BACTERIA UR QL AUTO: ABNORMAL /HPF
BILIRUB UR QL STRIP: NEGATIVE
CLARITY UR: ABNORMAL
COLOR UR: ABNORMAL
GLUCOSE UR STRIP-MCNC: NEGATIVE MG/DL
HGB UR QL STRIP.AUTO: ABNORMAL
HYALINE CASTS #/AREA URNS LPF: ABNORMAL /LPF
KETONES UR STRIP-MCNC: NEGATIVE MG/DL
LEUKOCYTE ESTERASE UR QL STRIP: NEGATIVE
NITRITE UR QL STRIP: NEGATIVE
NON-SQ EPI CELLS URNS QL MICRO: ABNORMAL /HPF
PH UR STRIP.AUTO: 6.5 [PH]
PROT UR STRIP-MCNC: NEGATIVE MG/DL
RBC #/AREA URNS AUTO: ABNORMAL /HPF
SP GR UR STRIP.AUTO: 1.02 (ref 1–1.03)
UROBILINOGEN UR QL STRIP.AUTO: 0.2 E.U./DL
WBC #/AREA URNS AUTO: ABNORMAL /HPF

## 2021-07-01 PROCEDURE — 81001 URINALYSIS AUTO W/SCOPE: CPT

## 2021-07-01 PROCEDURE — 87086 URINE CULTURE/COLONY COUNT: CPT

## 2021-07-01 RX ORDER — CIPROFLOXACIN 500 MG/1
500 TABLET, FILM COATED ORAL EVERY 12 HOURS SCHEDULED
Qty: 14 TABLET | Refills: 0 | Status: SHIPPED | OUTPATIENT
Start: 2021-07-01 | End: 2021-07-08

## 2021-07-01 RX ORDER — CIPROFLOXACIN 500 MG/1
500 TABLET, FILM COATED ORAL EVERY 12 HOURS SCHEDULED
Qty: 14 TABLET | Refills: 0 | Status: SHIPPED | OUTPATIENT
Start: 2021-07-01 | End: 2021-07-01 | Stop reason: SDUPTHER

## 2021-07-01 RX ORDER — PHENAZOPYRIDINE HYDROCHLORIDE 100 MG/1
100 TABLET, FILM COATED ORAL 3 TIMES DAILY PRN
Qty: 30 TABLET | Refills: 0 | Status: SHIPPED | OUTPATIENT
Start: 2021-07-01

## 2021-07-02 LAB — BACTERIA UR CULT: NORMAL

## 2021-07-04 ENCOUNTER — TELEPHONE (OUTPATIENT)
Dept: INTERNAL MEDICINE CLINIC | Facility: CLINIC | Age: 41
End: 2021-07-04

## 2021-07-04 NOTE — TELEPHONE ENCOUNTER
Patient called regarding having blood in urine with UTI symptoms  Did recently have surgery for mesh removal with entrapped nerves with Dr Louisa Elizalde on 6/2  Did obtain UA CS did start patient on Cipro and Pyridium  Urine culture is negative for UTI is still complaining of blood in urine did advise her to call the surgeon performing her surgery and if any worsening of symptoms over weekend to go to ER

## 2023-07-28 DIAGNOSIS — F41.9 ANXIETY: ICD-10-CM

## 2023-07-28 DIAGNOSIS — Z13.21 ENCOUNTER FOR VITAMIN DEFICIENCY SCREENING: ICD-10-CM

## 2023-07-28 DIAGNOSIS — Z13.6 SCREENING FOR CARDIOVASCULAR CONDITION: Primary | ICD-10-CM

## 2023-07-28 DIAGNOSIS — Z13.29 SCREENING FOR THYROID DISORDER: ICD-10-CM

## 2023-10-17 ENCOUNTER — OFFICE VISIT (OUTPATIENT)
Dept: INTERNAL MEDICINE CLINIC | Facility: CLINIC | Age: 43
End: 2023-10-17
Payer: COMMERCIAL

## 2023-10-17 VITALS — HEART RATE: 105 BPM | OXYGEN SATURATION: 99 %

## 2023-10-17 DIAGNOSIS — H92.01 RIGHT EAR PAIN: Primary | ICD-10-CM

## 2023-10-17 DIAGNOSIS — F32.A ANXIETY AND DEPRESSION: ICD-10-CM

## 2023-10-17 DIAGNOSIS — F41.9 ANXIETY AND DEPRESSION: ICD-10-CM

## 2023-10-17 PROBLEM — Z13.21 ENCOUNTER FOR VITAMIN DEFICIENCY SCREENING: Status: RESOLVED | Noted: 2020-07-23 | Resolved: 2023-10-17

## 2023-10-17 PROBLEM — M79.2 INGUINAL NEURALGIA: Status: ACTIVE | Noted: 2020-09-19

## 2023-10-17 PROBLEM — Z87.19 S/P RIGHT INGUINAL HERNIA REPAIR: Status: ACTIVE | Noted: 2021-05-04
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
## 2023-10-17 PROBLEM — G57.11 MERALGIA PARESTHETICA OF RIGHT SIDE: Status: ACTIVE | Noted: 2023-05-04

## 2023-10-17 PROBLEM — Z98.890 S/P RIGHT INGUINAL HERNIA REPAIR: Status: ACTIVE | Noted: 2021-05-04

## 2023-10-17 PROBLEM — Z79.891 ENCOUNTER FOR LONG-TERM OPIATE ANALGESIC USE: Status: RESOLVED | Noted: 2021-06-04 | Resolved: 2023-10-17

## 2023-10-17 PROCEDURE — 99203 OFFICE O/P NEW LOW 30 MIN: CPT | Performed by: NURSE PRACTITIONER

## 2023-10-17 RX ORDER — METHYLPREDNISOLONE 4 MG/1
TABLET ORAL
Qty: 21 EACH | Refills: 0 | Status: SHIPPED | OUTPATIENT
Start: 2023-10-17

## 2023-10-17 RX ORDER — ESCITALOPRAM OXALATE 10 MG/1
10 TABLET ORAL DAILY
Qty: 30 TABLET | Refills: 5 | Status: SHIPPED | OUTPATIENT
Start: 2023-10-17 | End: 2024-04-14

## 2023-10-17 RX ORDER — AMOXICILLIN 875 MG/1
875 TABLET, COATED ORAL 2 TIMES DAILY
Qty: 20 TABLET | Refills: 0 | Status: SHIPPED | OUTPATIENT
Start: 2023-10-17 | End: 2023-10-27

## 2023-10-17 NOTE — PROGRESS NOTES
Name: Gayatri Vo      : 1980      MRN: [id number]  Encounter Provider: BELINDA Hodges  Encounter Date: 10/17/2023   Encounter department: [address] [facility] Will start on amoxil and medrol dose pack. Will start on Lexapro 10 mg daily. Will follow up for her routine wellness. 1. Right ear pain  -     amoxicillin (AMOXIL) 875 mg tablet; Take 1 tablet (875 mg total) by mouth 2 (two) times a day for 10 days  -     methylPREDNISolone 4 MG tablet therapy pack; Use as directed on package    2. Anxiety and depression  -     escitalopram (LEXAPRO) 10 mg tablet; Take 1 tablet (10 mg total) by mouth daily           Subjective      HPI  Review of Systems    Current Outpatient Medications on File Prior to Visit   Medication Sig    naloxone (NARCAN) 4 mg/0.1 mL nasal spray Administer 1 spray into a nostril. If no response after 2-3 minutes, give another dose in the other nostril using a new spray.     oxyCODONE (ROXICODONE) 5 mg immediate release tablet Take 1 tablet (5 mg total) by mouth every 6 (six) hours as needed for moderate pain Do not fill until 2021Max Daily Amount: 20 mg    phenazopyridine (PYRIDIUM) 100 mg tablet Take 1 tablet (100 mg total) by mouth 3 (three) times a day as needed for bladder spasms    pregabalin (LYRICA) 50 mg capsule 1 PO QHS x 1 day, then 1 PO BID x 1 day, then 1 PO TID       Objective     Pulse 105   SpO2 99%     Physical Exam   [facility] Rene Martell

## 2023-10-31 ENCOUNTER — OFFICE VISIT (OUTPATIENT)
Dept: INTERNAL MEDICINE CLINIC | Facility: CLINIC | Age: 43
End: 2023-10-31
Payer: COMMERCIAL

## 2023-10-31 VITALS
WEIGHT: 137.2 LBS | BODY MASS INDEX: 22.05 KG/M2 | TEMPERATURE: 98.2 F | HEIGHT: 66 IN | DIASTOLIC BLOOD PRESSURE: 72 MMHG | SYSTOLIC BLOOD PRESSURE: 105 MMHG

## 2023-10-31 DIAGNOSIS — F11.20 UNCOMPLICATED OPIOID DEPENDENCE (HCC): ICD-10-CM

## 2023-10-31 DIAGNOSIS — G57.81 ENTRAPMENT OF RIGHT ILIOINGUINAL NERVE: ICD-10-CM

## 2023-10-31 DIAGNOSIS — G89.4 CHRONIC PAIN SYNDROME: ICD-10-CM

## 2023-10-31 DIAGNOSIS — Z00.00 ROUTINE ADULT HEALTH MAINTENANCE: Primary | ICD-10-CM

## 2023-10-31 DIAGNOSIS — M79.2 INGUINAL NEURALGIA: ICD-10-CM

## 2023-10-31 PROBLEM — G57.80 ENTRAPMENT, ILIOINGUINAL NERVE: Status: ACTIVE | Noted: 2021-05-04

## 2023-10-31 PROBLEM — H92.01 RIGHT EAR PAIN: Status: RESOLVED | Noted: 2023-10-17 | Resolved: 2023-10-31

## 2023-10-31 PROCEDURE — 99396 PREV VISIT EST AGE 40-64: CPT | Performed by: NURSE PRACTITIONER

## 2023-10-31 RX ORDER — FENTANYL 12.5 UG/1
1 PATCH TRANSDERMAL
COMMUNITY
Start: 2023-10-25

## 2023-10-31 RX ORDER — OXYCODONE HYDROCHLORIDE 10 MG/1
10 TABLET ORAL EVERY 6 HOURS PRN
COMMUNITY
Start: 2023-10-25

## 2023-10-31 NOTE — PROGRESS NOTES
Name: Adri Mendez      : 1980      MRN: [id number]  Encounter Provider: BELINDA Khan  Encounter Date: 10/31/2023   Encounter department: [address] [place] [address] to see pain management with EMG upcoming. Deferring screenings and vaccines. Will have labs done. Will give number to therapy. Will follow up in one year or sooner if need be. 1. Routine adult health maintenance    2. Chronic pain syndrome    3. Uncomplicated opioid dependence ([address])    4. Entrapment of right ilioinguinal nerve    5. Inguinal neuralgia           Subjective      Mónica Green is for a wellness. She continues to having chronic nerve pain in the right groin. She is seeing pain management is on oxy and Fentanyl for pain management. She is having an EMG done on Thursday due to her continued pain in her thigh and hip. She did not yet start her Lexapro she is considering about possibly seeing a therapist. She denies any chest pain, SOB, or palpitations. She is deferring screenings right now and any vaccines. She offers no other issues. Review of Systems   Musculoskeletal:  Positive for arthralgias and myalgias. All other systems reviewed and are negative.       Current Outpatient Medications on File Prior to Visit   Medication Sig    fentaNYL (DURAGESIC) 12 mcg/hr TD 72 hr patch Place 1 patch on the skin every third day    oxyCODONE (ROXICODONE) 10 MG TABS Take 10 mg by mouth every 6 (six) hours as needed    oxyCODONE (ROXICODONE) 5 mg immediate release tablet Take 1 tablet (5 mg total) by mouth every 6 (six) hours as needed for moderate pain Do not fill until 2021Max Daily Amount: 20 mg    [DISCONTINUED] escitalopram (LEXAPRO) 10 mg tablet Take 1 tablet (10 mg total) by mouth daily (Patient not taking: Reported on 10/31/2023)    [DISCONTINUED] methylPREDNISolone 4 MG tablet therapy pack Use as directed on package (Patient not taking: Reported on 10/31/2023) [DISCONTINUED] naloxone (NARCAN) 4 mg/0.1 mL nasal spray Administer 1 spray into a nostril. If no response after 2-3 minutes, give another dose in the other nostril using a new spray. (Patient not taking: Reported on 10/31/2023)    [DISCONTINUED] phenazopyridine (PYRIDIUM) 100 mg tablet Take 1 tablet (100 mg total) by mouth 3 (three) times a day as needed for bladder spasms (Patient not taking: Reported on 10/31/2023)    [DISCONTINUED] pregabalin (LYRICA) 50 mg capsule 1 PO QHS x 1 day, then 1 PO BID x 1 day, then 1 PO TID (Patient not taking: Reported on 10/31/2023)       Objective     Temp 98.2 °F (36.8 °C) (Temporal)   Ht 5' 6" (1.676 m)   Wt 62.2 kg (137 lb 3.2 oz)   BMI 22.14 kg/m²     Physical Exam  Vitals reviewed. Constitutional:       Appearance: Normal appearance. She is normal weight. HENT:      Head: Normocephalic and atraumatic. Right Ear: Tympanic membrane, ear canal and external ear normal.      Left Ear: Tympanic membrane, ear canal and external ear normal.      Nose: Nose normal.      Mouth/Throat:      Mouth: Mucous membranes are moist.      Pharynx: Oropharynx is clear. Eyes:      Conjunctiva/sclera: Conjunctivae normal.      Pupils: Pupils are equal, round, and reactive to light. Cardiovascular:      Rate and Rhythm: Normal rate and regular rhythm. Pulses: Normal pulses. Heart sounds: Normal heart sounds. Pulmonary:      Effort: Pulmonary effort is normal.      Breath sounds: Normal breath sounds. Abdominal:      General: Abdomen is flat. Bowel sounds are normal.      Palpations: Abdomen is soft. Musculoskeletal:      Cervical back: Normal range of motion and neck supple. Skin:     General: Skin is warm and dry. Capillary Refill: Capillary refill takes less than 2 seconds. Neurological:      General: No focal deficit present. Mental Status: She is alert and oriented to person, place, and time. Mental status is at baseline.    Psychiatric:         Mood and Affect: Mood normal.         Behavior: Behavior normal.         Thought Content:  Thought content normal.         Judgment: Judgment normal.       BELINDA Rojo

## 2023-12-30 PROBLEM — Z00.00 ROUTINE ADULT HEALTH MAINTENANCE: Status: RESOLVED | Noted: 2023-10-31 | Resolved: 2023-12-30

## 2024-02-21 PROBLEM — Z13.29 SCREENING FOR THYROID DISORDER: Status: RESOLVED | Noted: 2020-07-23 | Resolved: 2024-02-21

## 2024-02-21 PROBLEM — Z13.6 SCREENING FOR CARDIOVASCULAR CONDITION: Status: RESOLVED | Noted: 2020-07-23 | Resolved: 2024-02-21

## 2024-05-02 ENCOUNTER — OFFICE VISIT (OUTPATIENT)
Dept: INTERNAL MEDICINE CLINIC | Facility: CLINIC | Age: 44
End: 2024-05-02
Payer: COMMERCIAL

## 2024-05-02 VITALS — HEART RATE: 72 BPM | OXYGEN SATURATION: 98 % | TEMPERATURE: 98.7 F

## 2024-05-02 DIAGNOSIS — J06.9 UPPER RESPIRATORY TRACT INFECTION, UNSPECIFIED TYPE: Primary | ICD-10-CM

## 2024-05-02 PROCEDURE — 99213 OFFICE O/P EST LOW 20 MIN: CPT | Performed by: NURSE PRACTITIONER

## 2024-05-02 RX ORDER — AZITHROMYCIN 250 MG/1
TABLET, FILM COATED ORAL
Qty: 6 TABLET | Refills: 0 | Status: SHIPPED | OUTPATIENT
Start: 2024-05-02 | End: 2024-05-06

## 2024-05-02 RX ORDER — METHYLPREDNISOLONE 4 MG/1
TABLET ORAL
Qty: 21 EACH | Refills: 0 | Status: SHIPPED | OUTPATIENT
Start: 2024-05-02

## 2024-05-02 NOTE — PROGRESS NOTES
Name: Leonor Cleary      : 1980      MRN: [id number]  Encounter Provider: BELINDA Moore  Encounter Date: 2024   Encounter department: [facility]    Assessment & Plan Will start on a Z pack and medrol dose pack take as directed. Continue supportive care. If any worsening of symptoms did advise to call the office.     1. Upper respiratory tract infection, unspecified type  -     azithromycin (ZITHROMAX) 250 mg tablet; Take 2 tablets today then 1 tablet daily x 4 days  -     methylPREDNISolone 4 MG tablet therapy pack; Use as directed on package           Subjective      Jalyn is for an acute visit. For the past 10 days she has been having congestion and cough. She did have a fever originally but this did stop. She is having some ear fullness on the right side. She feels her cough is coming from PND. She offers no other issues.      Review of Systems   HENT:  Positive for congestion and postnasal drip.    Respiratory:  Positive for cough.    All other systems reviewed and are negative.      Current Outpatient Medications on File Prior to Visit   Medication Sig    fentaNYL (DURAGESIC) 12 mcg/hr TD 72 hr patch Place 1 patch on the skin every third day    oxyCODONE (ROXICODONE) 10 MG TABS Take 10 mg by mouth every 6 (six) hours as needed    oxyCODONE (ROXICODONE) 5 mg immediate release tablet Take 1 tablet (5 mg total) by mouth every 6 (six) hours as needed for moderate pain Do not fill until 2021Max Daily Amount: 20 mg       Objective     There were no vitals taken for this visit.    Physical Exam  Vitals reviewed.   Constitutional:       Appearance: Normal appearance. She is normal weight.   HENT:      Right Ear: Tympanic membrane, ear canal and external ear normal.      Left Ear: Tympanic membrane, ear canal and external ear normal.      Nose: Congestion present.      Mouth/Throat:      Mouth: Mucous membranes are moist.      Pharynx: Oropharynx is clear.    Cardiovascular:      Rate and Rhythm: Normal rate and regular rhythm.      Pulses: Normal pulses.      Heart sounds: Normal heart sounds.   Pulmonary:      Effort: Pulmonary effort is normal.      Breath sounds: Normal breath sounds.   Skin:     General: Skin is warm and dry.      Capillary Refill: Capillary refill takes less than 2 seconds.   Neurological:      General: No focal deficit present.      Mental Status: She is alert and oriented to person, place, and time. Mental status is at baseline.   Psychiatric:         Mood and Affect: Mood normal.         Thought Content: Thought content normal.         Judgment: Judgment normal.       BELINDA Moore

## 2024-07-24 ENCOUNTER — VBI (OUTPATIENT)
Dept: ADMINISTRATIVE | Facility: OTHER | Age: 44
End: 2024-07-24

## 2024-07-24 NOTE — TELEPHONE ENCOUNTER
07/24/24 1:16 PM     Chart reviewed for CRC: Colonoscopy ; nothing is submitted to the patient's insurance at this time.     Rand Gandhi   [facility]

## (undated) DEVICE — PENROSE DRAIN, 18 X 3 8: Brand: CARDINAL HEALTH

## (undated) DEVICE — SUT PROLENE 4-0 PS-2 18 IN 8682H

## (undated) DEVICE — FLEXIBLE ADHESIVE BANDAGE,X-LARGE: Brand: CURITY

## (undated) DEVICE — BETHLEHEM UNIVERSAL MINOR GEN: Brand: CARDINAL HEALTH

## (undated) DEVICE — GLOVE INDICATOR PI UNDERGLOVE SZ 7 BLUE

## (undated) DEVICE — SUT PDS II 1 CT-1 27 IN Z347H

## (undated) DEVICE — SUT VICRYL 3-0 SH 27 IN J416H

## (undated) DEVICE — DRAPE EQUIPMENT RF WAND

## (undated) DEVICE — IV EXTENSION TUBING 33 IN

## (undated) DEVICE — GLOVE SRG BIOGEL 7

## (undated) DEVICE — Device

## (undated) DEVICE — NEEDLE 25G X 1 1/2

## (undated) DEVICE — PAD GROUNDING ADULT

## (undated) DEVICE — SUT VICRYL 2-0 SH 27 IN UNDYED J417H

## (undated) DEVICE — CHLORAPREP HI-LITE 10.5ML ORANGE

## (undated) DEVICE — TRAY EPIDURAL SINGLE SHOT

## (undated) DEVICE — INTENDED FOR TISSUE SEPARATION, AND OTHER PROCEDURES THAT REQUIRE A SHARP SURGICAL BLADE TO PUNCTURE OR CUT.: Brand: BARD-PARKER SAFETY BLADES SIZE 15, STERILE

## (undated) DEVICE — ADHESIVE SKIN HIGH VISCOSITY EXOFIN 1ML

## (undated) DEVICE — SWABSTCK, BENZOIN TINCTURE, 1/PK, STRL: Brand: APLICARE

## (undated) DEVICE — SYRINGE 10ML LL

## (undated) DEVICE — MEDI-VAC YANK SUCT HNDL W/TPRD BULBOUS TIP: Brand: CARDINAL HEALTH

## (undated) DEVICE — 3M™ TEGADERM™ TRANSPARENT FILM DRESSING FRAME STYLE, 1626W, 4 IN X 4-3/4 IN (10 CM X 12 CM), 50/CT 4CT/CASE: Brand: 3M™ TEGADERM™

## (undated) DEVICE — BANDAID SHEER SPOT

## (undated) DEVICE — GLOVE SRG BIOGEL ECLIPSE 7

## (undated) DEVICE — CHLORAPREP HI-LITE 26ML ORANGE

## (undated) DEVICE — 3M™ STERI-STRIP™ REINFORCED ADHESIVE SKIN CLOSURES, R1546, 1/4 IN X 4 IN (6 MM X 100 MM), 10 STRIPS/ENVELOPE: Brand: 3M™ STERI-STRIP™

## (undated) DEVICE — TUBING SUCTION 5MM X 12 FT

## (undated) DEVICE — PLUMEPEN PRO 10FT

## (undated) DEVICE — GAUZE SPONGES,16 PLY: Brand: CURITY

## (undated) DEVICE — COVER PROBE ULTRASOUND

## (undated) DEVICE — SUT MONOCRYL 4-0 PS-2 27 IN Y426H

## (undated) DEVICE — BINDER ABDOMINAL 46-62 IN